# Patient Record
Sex: MALE | Race: WHITE | NOT HISPANIC OR LATINO | Employment: OTHER | URBAN - METROPOLITAN AREA
[De-identification: names, ages, dates, MRNs, and addresses within clinical notes are randomized per-mention and may not be internally consistent; named-entity substitution may affect disease eponyms.]

---

## 2024-05-06 PROBLEM — M79.7 FIBROMYALGIA SYNDROME: Status: ACTIVE | Noted: 2024-05-06

## 2024-05-08 ENCOUNTER — CLINICAL SUPPORT (OUTPATIENT)
Age: 54
End: 2024-05-08
Payer: MEDICARE

## 2024-05-08 VITALS
DIASTOLIC BLOOD PRESSURE: 70 MMHG | TEMPERATURE: 97.5 F | SYSTOLIC BLOOD PRESSURE: 130 MMHG | WEIGHT: 290.5 LBS | HEART RATE: 70 BPM

## 2024-05-08 DIAGNOSIS — M17.9 OSTEOARTHRITIS OF KNEE, UNSPECIFIED LATERALITY, UNSPECIFIED OSTEOARTHRITIS TYPE: Primary | ICD-10-CM

## 2024-05-08 PROCEDURE — 20610 DRAIN/INJ JOINT/BURSA W/O US: CPT | Performed by: NURSE PRACTITIONER

## 2024-05-08 RX ORDER — FOSINOPRIL SODIUM 40 MG/1
40 TABLET ORAL DAILY
COMMUNITY
Start: 2024-01-30 | End: 2025-01-29

## 2024-05-08 RX ORDER — FLURBIPROFEN SODIUM 0.3 MG/ML
SOLUTION/ DROPS OPHTHALMIC
COMMUNITY
Start: 2024-02-05

## 2024-05-08 RX ORDER — ACYCLOVIR 400 MG/1
TABLET ORAL
COMMUNITY
Start: 2024-04-26

## 2024-05-08 RX ORDER — DAPAGLIFLOZIN 10 MG/1
10 TABLET, FILM COATED ORAL DAILY
COMMUNITY
Start: 2024-01-30 | End: 2025-01-29

## 2024-05-08 RX ORDER — METHYLPREDNISOLONE ACETATE 80 MG/ML
80 INJECTION, SUSPENSION INTRA-ARTICULAR; INTRALESIONAL; INTRAMUSCULAR; SOFT TISSUE
Status: DISCONTINUED | OUTPATIENT
Start: 2024-05-08 | End: 2024-05-08 | Stop reason: HOSPADM

## 2024-05-08 RX ORDER — INSULIN HUMAN 100 [IU]/ML
INJECTION, SUSPENSION SUBCUTANEOUS
COMMUNITY
Start: 2024-01-30 | End: 2025-01-30

## 2024-05-08 RX ORDER — FLURBIPROFEN SODIUM 0.3 MG/ML
SOLUTION/ DROPS OPHTHALMIC
COMMUNITY
Start: 2024-03-13

## 2024-05-08 RX ADMIN — METHYLPREDNISOLONE ACETATE 80 MG: 80 INJECTION, SUSPENSION INTRA-ARTICULAR; INTRALESIONAL; INTRAMUSCULAR; SOFT TISSUE at 10:49

## 2024-05-15 ENCOUNTER — CLINICAL SUPPORT (OUTPATIENT)
Age: 54
End: 2024-05-15
Payer: MEDICARE

## 2024-05-15 VITALS
DIASTOLIC BLOOD PRESSURE: 80 MMHG | HEIGHT: 71 IN | WEIGHT: 288.7 LBS | HEART RATE: 77 BPM | BODY MASS INDEX: 40.42 KG/M2 | SYSTOLIC BLOOD PRESSURE: 110 MMHG | TEMPERATURE: 97.9 F

## 2024-05-15 DIAGNOSIS — M17.0 BILATERAL PRIMARY OSTEOARTHRITIS OF KNEE: Primary | ICD-10-CM

## 2024-05-15 RX ORDER — METHYLPREDNISOLONE ACETATE 80 MG/ML
80 INJECTION, SUSPENSION INTRA-ARTICULAR; INTRALESIONAL; INTRAMUSCULAR; SOFT TISSUE
Status: DISCONTINUED | OUTPATIENT
Start: 2024-05-15 | End: 2024-05-15 | Stop reason: HOSPADM

## 2024-05-15 RX ADMIN — METHYLPREDNISOLONE ACETATE 80 MG: 80 INJECTION, SUSPENSION INTRA-ARTICULAR; INTRALESIONAL; INTRAMUSCULAR; SOFT TISSUE at 08:41

## 2024-05-15 NOTE — ASSESSMENT & PLAN NOTE
Most recent right knee steroid injection was given 2/6/2024  We will repeat injection today 5/15/2024  Risks and benefits of the procedure were discussed with the patient and consent was obtained.  See procedure documentation.

## 2024-05-15 NOTE — PROGRESS NOTES
Office Visit       Date: 05/15/2024   Patient Name: Zoran Lechuga  MRN: [id number]  YOB: 1970    Referring Physician: Provider, No Known     Chief Complaint:   Chief Complaint   Patient presents with    Injections       History of Present Illness: Zoran Lechuga is a 54 y.o. male who is here today for right knee steroid injection.  He most recently had this knee injected on 2/6/2024.  He has failed conservative measures.      Subjective     Past Medical History:   Past Medical History:   Diagnosis Date    Arthralgia     Aspiration pneumonia     Condition not found     pain relief by medication- SP 8/22/2017 pain medication agreement    Coronary artery disease 01/10/2019    s/p MI 11/15 with one stent. Dr. Rickie Boateng    COVID-19 01/23/2021    Fibromyalgia     Gastritis     Hip joint pain     bilateral    Hypothyroidism     Low back pain     Myocardial infarct     BLADIMIR (obstructive sleep apnea)     Osteoarthritis of both knees     Restless leg syndrome        Past Surgical History:   Past Surgical History:   Procedure Laterality Date    CARPAL TUNNEL RELEASE Right 2019    CHOLECYSTECTOMY      08/03/2020-02/2020- Gallstones + cholecystitis    CORONARY ANGIOPLASTY WITH STENT PLACEMENT  12/18/2020    heart stent    WRIST SURGERY      08/03/2020-2019 R CTS REelease; repair of [place] deformity       Family History:   Family History   Problem Relation Age of Onset    Other Mother         malignant neoplasm of uterus    Melanoma Brother        Social History:   Social History     Socioeconomic History    Marital status:     Number of children: 1   Tobacco Use    Smoking status: Never     Passive exposure: Never    Smokeless tobacco: Never   Substance and Sexual Activity    Alcohol use: Never    Drug use: Never    Sexual activity: Defer       Medications:   Current Outpatient Medications:     amLODIPine (NORVASC) 10 MG tablet, Take 1 tablet by mouth Daily., Disp: , Rfl:      aspirin 81 MG EC tablet, Take 1 tablet by mouth Daily., Disp: , Rfl:     atorvastatin (LIPITOR) 80 MG tablet, Take 99 mg by oral route everyday, Disp: , Rfl:     B-D UF III MINI PEN NEEDLES 31G X 5 MM misc, , Disp: , Rfl:     baclofen (LIORESAL) 10 MG tablet, Take 1 tablet by mouth 3 (Three) Times a Day., Disp: , Rfl:     betamethasone dipropionate (DIPROSONE) 0.05 % cream, Apply by topical route every day a thin layer to the affected areas, Disp: , Rfl:     chlorthalidone (HYGROTON) 25 MG tablet, Take 1 tablet by mouth Daily., Disp: , Rfl:     clopidogrel (PLAVIX) 75 MG tablet, Take 1 tablet by mouth Daily., Disp: , Rfl:     Continuous Glucose Sensor (Dexcom G7 Sensor) misc, , Disp: , Rfl:     dapagliflozin Propanediol 10 MG tablet, Take 10 mg by mouth Daily., Disp: , Rfl:     Diclofenac Sodium (VOLTAREN) 1 % gel gel, Apply 2 g topically to the appropriate area as directed 2 (Two) Times a Day., Disp: , Rfl:     empagliflozin (Jardiance) 25 MG tablet tablet, Take 1 tablet by mouth Every Morning., Disp: , Rfl:     folic acid (FOLVITE) 1 MG tablet, Take 1 tablet by mouth Daily., Disp: , Rfl:     fosinopril (MONOPRIL) 40 MG tablet, Take 1 tablet by mouth Daily., Disp: , Rfl:     gabapentin (NEURONTIN) 300 MG capsule, Take 3 capsules by mouth 3 (Three) Times a Day., Disp: , Rfl:     HumuLIN N KwikPen 100 UNIT/ML injection, INJECT 20UNITS IN THE MORNING AND 26 UNITS IN THE EVENING, Disp: , Rfl:     Insulin Lispro, 0.5 Unit Dial, (HumaLOG Asim KwikPen) 100 UNIT/ML solution pen-injector, Inject 10 Units under the skin into the appropriate area as directed. As per insulin protocol, Disp: , Rfl:     isosorbide mononitrate (IMDUR) 60 MG 24 hr tablet, Take 1 tablet by mouth Every Morning., Disp: , Rfl:     metFORMIN (GLUCOPHAGE) 1000 MG tablet, Take 1 tablet by mouth. Every evening, Disp: , Rfl:     methotrexate 2.5 MG tablet, Take 6 tablets by mouth 1 (One) Time Per Week., Disp: , Rfl:     metoprolol succinate XL  "(TOPROL-XL) 100 MG 24 hr tablet, Take 1 tablet by mouth Daily., Disp: , Rfl:     multivitamin (MULTIVITAMIN PO), Take 1 tablet by mouth Daily., Disp: , Rfl:     nitroglycerin (NITROSTAT) 0.4 MG SL tablet, Place 1 tablet under the tongue Every 5 (Five) Minutes As Needed for Chest Pain. Take no more than 3 doses in 15 minutes., Disp: , Rfl:     omeprazole (priLOSEC) 40 MG capsule, Take 1 capsule by mouth. Everyday before a meal, Disp: , Rfl:     Probiotic Product (PROBIOTIC-10 PO), Take  by mouth., Disp: , Rfl:     Semaglutide,0.25 or 0.5MG/DOS, (Ozempic, 0.25 or 0.5 MG/DOSE,) 2 MG/1.5ML solution pen-injector, Inject 0.25 mg under the skin into the appropriate area as directed Every 28 (Twenty-Eight) Days. Then inject 0.5 mg subcutaneously once weekly using rotating injection sites, Disp: , Rfl:     traMADol (ULTRAM) 50 MG tablet, Take 1 tablet by mouth Every 6 (Six) Hours As Needed., Disp: , Rfl:     traZODone (DESYREL) 50 MG tablet, Take 1 tablet by mouth Every Night., Disp: , Rfl:     UltiCare Mini Pen Needles 31G X 6 MM misc, , Disp: , Rfl:     Allergies: No Known Allergies    I reviewed the patient's chief complaint, history of present illness, review of systems, past medical history, surgical history, family history, social history, medications and allergy list.     Objective    Vital Signs:   Vitals:    05/15/24 0826   BP: 110/80   Pulse: 77   Temp: 97.9 °F (36.6 °C)   Weight: 131 kg (288 lb 11.2 oz)   Height: 180.3 cm (71\")   PainSc:   6   PainLoc: Back     Body mass index is 40.27 kg/m².           Physical Exam:  Physical Exam  Constitutional:       Appearance: Normal appearance.   HENT:      Head: Normocephalic and atraumatic.   Eyes:      Conjunctiva/sclera: Conjunctivae normal.      Pupils: Pupils are equal, round, and reactive to light.   Cardiovascular:      Rate and Rhythm: Normal rate and regular rhythm.      Heart sounds: Normal heart sounds.   Pulmonary:      Effort: Pulmonary effort is normal.      " Breath sounds: Normal breath sounds.   Musculoskeletal:         General: Normal range of motion.      Cervical back: Normal range of motion.      Comments: Right knee without swelling, warmth, erythema.   Skin:     General: Skin is warm and dry.   Neurological:      General: No focal deficit present.      Mental Status: He is alert and oriented to person, place, and time.   Psychiatric:         Mood and Affect: Mood normal.         Behavior: Behavior normal.         Thought Content: Thought content normal.         Judgment: Judgment normal.          Results Review:   Imaging Results (Last 24 Hours)       ** No results found for the last 24 hours. **        Arthrocentesis    Date/Time: 5/15/2024 8:41 AM    Performed by: Rola Park APRN  Authorized by: Rola Park APRN  Indications: pain   Body area: knee  Joint: right knee  Local anesthesia used: yes    Anesthesia:  Local anesthesia used: yes  Local Anesthetic: topical anesthetic and lidocaine 1% without epinephrine  Anesthetic total: 0.5 mL    Sedation:  Patient sedated: no    Preparation: Patient was prepped and draped in the usual sterile fashion.  Needle size: 22 G  Ultrasound guidance: no  Approach: anterior  Meds administered: 80 mg methylPREDNISolone acetate 80 MG/ML  Patient tolerance: patient tolerated the procedure well with no immediate complications           Assessment / Plan    Assessment/Plan:   Diagnoses and all orders for this visit:    1. Bilateral primary osteoarthritis of knee (Primary)  Assessment & Plan:  Most recent right knee steroid injection was given 2/6/2024  We will repeat injection today 5/15/2024  Risks and benefits of the procedure were discussed with the patient and consent was obtained.  See procedure documentation.    Orders:  -     Arthrocentesis        Follow Up:   Return in 3 months (on 8/15/2024) for Dr. Castellon.  OK to schedule knee injections in 3 months- can do both knees same day on or after 8/15/24.      Rola Park  DANNIE  [facility]

## 2024-07-10 DIAGNOSIS — M25.541 ARTHRALGIA OF BOTH HANDS: Primary | ICD-10-CM

## 2024-07-10 DIAGNOSIS — M25.542 ARTHRALGIA OF BOTH HANDS: Primary | ICD-10-CM

## 2024-07-10 NOTE — TELEPHONE ENCOUNTER
Pt sent message via [employer] requesting refills for MTX and Folic Acid.  His MTX was increased to 8/week at his 4/22/24 visit.  He has appt on 8/15/24.  He had labs today and labs are in chart for [facility] to review.  Can you please send rx as the system won't allow me to send? Thank you. -CHUCK Causey

## 2024-07-13 RX ORDER — FOLIC ACID 1 MG/1
TABLET ORAL
Qty: 30 TABLET | Refills: 1 | Status: SHIPPED | OUTPATIENT
Start: 2024-07-13

## 2024-07-13 RX ORDER — METHOTREXATE 2.5 MG/1
TABLET ORAL
Qty: 32 TABLET | Refills: 1 | Status: SHIPPED | OUTPATIENT
Start: 2024-07-13

## 2024-08-14 PROBLEM — Z79.899 ENCOUNTER FOR LONG-TERM (CURRENT) USE OF HIGH-RISK MEDICATION: Status: ACTIVE | Noted: 2024-08-14

## 2024-08-14 PROBLEM — M25.512 LEFT SHOULDER PAIN: Status: ACTIVE | Noted: 2024-08-14

## 2024-08-14 PROBLEM — M54.9 BACK PAIN: Status: ACTIVE | Noted: 2024-08-14

## 2024-08-14 PROBLEM — M25.50 ARTHRALGIA: Status: ACTIVE | Noted: 2024-08-14

## 2024-08-14 NOTE — ASSESSMENT & PLAN NOTE
XR L shoulder normal 6/23  S/p injection June 2023    Does well with injection. Can call to schedule.  If sx worsen next option is MRI.

## 2024-08-14 NOTE — ASSESSMENT & PLAN NOTE
Intermittent muscle pain the last 3 weeks.   Currently on Baclofen and Gabapentin.     Continue Baclofen.   Continue gabapentin 400mg TID  Would like him to start YouTube - Chair aerobics or chair yoga. Needs to start.  Biofreeze, CBD oil or lotion. Using Biofreeze.  Voltaren is ok to use on the wrist.   Pcp is also prescribing low dose tramadol.

## 2024-08-14 NOTE — ASSESSMENT & PLAN NOTE
Combo of Mechanical + djd ; h/o epidural in 2003.   S/p PT in the past.   MRI lumbar spine 12/23 - multilevel DDD with facet arthritis. Moderate central and foraminal stenosis at L4-5. Mild central and L foraminal stenosis at L5-S1.   PT and exercise worsens back pain.   The more he walk, the worse his pain is. Radicular sx  bilaterally with walking.  Back stiffness every morning for 3-4 hours. Positive radicular sx bilaterally.    Will refer to NS. May need myelogram vs pain treatment. His sx I cannot see correlating with MRI report.

## 2024-08-14 NOTE — ASSESSMENT & PLAN NOTE
Multiple joints mildly tender; + Stiffness in the morning for 2.5 hours.  Distribution concerning for inflammatory arthritis in the hands.  Crepitus in the hands with flexion.  Positive Trigger thumbs.  KEVIN,CCP,RF3 negative August 2017.  Repeat RF3, CCP, CRP, ESR were negative, KEVIN was low positive 1:80 ( not significant) in 2021.   07/21 CMP: Calcium 8.2 CBC Normal.   Started Plaquenil trial in 12/18. Stopped 2/2020 due to suspected occular toxicity.   He states his cardiologist doesn't want him to take  Nsaids/tramadol or steroids.  Esr and CRP normal 5/22: 6/23.  Foot films 12/23 negative for erosions  Hand films 12/23 no erosions.    Will keep early PSA in mind.  Shoulders and knees do well with injections.  He did a mtx trial for PsA. Initially had significant improvement for one month. He now has stiffness and swelling again in the R hand. Increase mtx to 8 tablets weekly.   Contjnue folic acid 1mg daily except mtx day.

## 2024-08-14 NOTE — PROGRESS NOTES
Office Visit       Date: 08/15/2024   Patient Name: Zoran Lechuga  MRN: [id number]  YOB: 1970    Referring Physician: Abhinav Paredes*     Chief Complaint:   Chief Complaint   Patient presents with    Injections     Knees bilateral       History of Present Illness: Zoran Lechuga is a 54 y.o. male who is here today for bilateral knee steroid injections.  His right knee was most recently injected 5/15/2024.  His left knee was most recently injected 5/8/2024.  He has failed conservative measures.    Subjective       Past Medical History:   Past Medical History:   Diagnosis Date    Arthralgia     Aspiration pneumonia     Condition not found     pain relief by medication- SP 8/22/2017 pain medication agreement    Coronary artery disease 01/10/2019    s/p MI 11/15 with one stent. Dr. Rickie Boateng    COVID-19 01/23/2021    Fibromyalgia     Gastritis     Hip joint pain     bilateral    Hypothyroidism     Low back pain     Myocardial infarct     BLADIMIR (obstructive sleep apnea)     Osteoarthritis of both knees     Restless leg syndrome        Past Surgical History:   Past Surgical History:   Procedure Laterality Date    CARPAL TUNNEL RELEASE Right 2019    CHOLECYSTECTOMY      08/03/2020-02/2020- Gallstones + cholecystitis    CORONARY ANGIOPLASTY WITH STENT PLACEMENT  12/18/2020    heart stent    WRIST SURGERY      08/03/2020-2019 R CTS REelease; repair of [place] deformity       Family History:   Family History   Problem Relation Age of Onset    Other Mother         malignant neoplasm of uterus    Melanoma Brother        Social History:   Social History     Socioeconomic History    Marital status:     Number of children: 1   Tobacco Use    Smoking status: Never     Passive exposure: Never    Smokeless tobacco: Never   Vaping Use    Vaping status: Never Used   Substance and Sexual Activity    Alcohol use: Never    Drug use: Never    Sexual activity: Defer        Medications:   Current Outpatient Medications:     amLODIPine (NORVASC) 10 MG tablet, Take 1 tablet by mouth Daily., Disp: , Rfl:     aspirin 81 MG EC tablet, Take 1 tablet by mouth Daily., Disp: , Rfl:     atorvastatin (LIPITOR) 80 MG tablet, Take 99 mg by oral route everyday, Disp: , Rfl:     B-D UF III MINI PEN NEEDLES 31G X 5 MM misc, , Disp: , Rfl:     baclofen (LIORESAL) 10 MG tablet, Take 1 tablet by mouth 3 (Three) Times a Day., Disp: , Rfl:     betamethasone dipropionate (DIPROSONE) 0.05 % cream, Apply by topical route every day a thin layer to the affected areas, Disp: , Rfl:     chlorthalidone (HYGROTON) 25 MG tablet, Take 1 tablet by mouth Daily., Disp: , Rfl:     clopidogrel (PLAVIX) 75 MG tablet, Take 1 tablet by mouth Daily., Disp: , Rfl:     Continuous Glucose Sensor (Dexcom G7 Sensor) misc, , Disp: , Rfl:     dapagliflozin Propanediol 10 MG tablet, Take 10 mg by mouth Daily., Disp: , Rfl:     Diclofenac Sodium (VOLTAREN) 1 % gel gel, Apply 2 g topically to the appropriate area as directed 2 (Two) Times a Day., Disp: , Rfl:     empagliflozin (Jardiance) 25 MG tablet tablet, Take 1 tablet by mouth Every Morning., Disp: , Rfl:     folic acid (FOLVITE) 1 MG tablet, Take 1 tablet daily except the day of Methotrexate., Disp: 30 tablet, Rfl: 1    fosinopril (MONOPRIL) 40 MG tablet, Take 1 tablet by mouth Daily., Disp: , Rfl:     gabapentin (NEURONTIN) 300 MG capsule, Take 3 capsules by mouth 3 (Three) Times a Day., Disp: , Rfl:     HumuLIN N KwikPen 100 UNIT/ML injection, INJECT 20UNITS IN THE MORNING AND 26 UNITS IN THE EVENING, Disp: , Rfl:     Insulin Lispro, 0.5 Unit Dial, (HumaLOG Asim KwikPen) 100 UNIT/ML solution pen-injector, Inject 10 Units under the skin into the appropriate area as directed. As per insulin protocol, Disp: , Rfl:     isosorbide mononitrate (IMDUR) 60 MG 24 hr tablet, Take 1 tablet by mouth Every Morning., Disp: , Rfl:     metFORMIN (GLUCOPHAGE) 1000 MG tablet, Take 1  "tablet by mouth. Every evening, Disp: , Rfl:     methotrexate 2.5 MG tablet, Take 8 tabs po once weekly., Disp: 32 tablet, Rfl: 1    metoprolol succinate XL (TOPROL-XL) 100 MG 24 hr tablet, Take 1 tablet by mouth Daily., Disp: , Rfl:     multivitamin (MULTIVITAMIN PO), Take 1 tablet by mouth Daily., Disp: , Rfl:     nitroglycerin (NITROSTAT) 0.4 MG SL tablet, Place 1 tablet under the tongue Every 5 (Five) Minutes As Needed for Chest Pain. Take no more than 3 doses in 15 minutes., Disp: , Rfl:     omeprazole (priLOSEC) 40 MG capsule, Take 1 capsule by mouth. Everyday before a meal, Disp: , Rfl:     Probiotic Product (PROBIOTIC-10 PO), Take  by mouth., Disp: , Rfl:     Semaglutide,0.25 or 0.5MG/DOS, (Ozempic, 0.25 or 0.5 MG/DOSE,) 2 MG/1.5ML solution pen-injector, Inject 0.25 mg under the skin into the appropriate area as directed Every 28 (Twenty-Eight) Days. Then inject 0.5 mg subcutaneously once weekly using rotating injection sites, Disp: , Rfl:     traMADol (ULTRAM) 50 MG tablet, Take 1 tablet by mouth Every 6 (Six) Hours As Needed., Disp: , Rfl:     traZODone (DESYREL) 50 MG tablet, Take 1 tablet by mouth Every Night., Disp: , Rfl:     UltiCare Mini Pen Needles 31G X 6 MM misc, , Disp: , Rfl:   No current facility-administered medications for this visit.    Allergies: No Known Allergies    I reviewed the patient's chief complaint, history of present illness, review of systems, past medical history, surgical history, family history, social history, medications and allergy list.     Objective    Vital Signs:   Vitals:    08/15/24 0909   BP: 132/78   BP Location: Right arm   Pulse: 77   Temp: 98 °F (36.7 °C)   Weight: 132 kg (291 lb 11.2 oz)   Height: 180.3 cm (71\")   PainSc:   6   PainLoc: Generalized     Body mass index is 40.68 kg/m².           Physical Exam:  Physical Exam  Constitutional:       Appearance: Normal appearance.   HENT:      Head: Normocephalic and atraumatic.   Eyes:      Conjunctiva/sclera: " Conjunctivae normal.      Pupils: Pupils are equal, round, and reactive to light.   Cardiovascular:      Rate and Rhythm: Normal rate.   Pulmonary:      Effort: Pulmonary effort is normal.   Musculoskeletal:         General: Normal range of motion.      Cervical back: Normal range of motion.      Comments: Bilateral knees without warmth, erythema, effusion.   Skin:     General: Skin is warm and dry.   Neurological:      General: No focal deficit present.      Mental Status: He is alert and oriented to person, place, and time.   Psychiatric:         Mood and Affect: Mood normal.         Behavior: Behavior normal.         Thought Content: Thought content normal.         Judgment: Judgment normal.          Results Review:   Imaging Results (Last 24 Hours)       ** No results found for the last 24 hours. **            Assessment / Plan    Assessment/Plan:   Diagnoses and all orders for this visit:    1. Bilateral primary osteoarthritis of knee (Primary)  Assessment & Plan:  Chronic  stiffness and pain ; h/o steroid and Synvisc injxn--w/o relief; s/p left knee repair 2010/11.  S/p bilateral knee injections June 2023  Left knee steroid 5/8/24  Right knee steroid 5/15/24    He is here today for repeat bilateral knee steroid injections. See procedure documentation.  Continue Weight loss, water exercise if able.  If knee symptoms worsen, will have him see ortho.     Orders:  -     Arthrocentesis  -     methylPREDNISolone acetate (DEPO-medrol) injection 80 mg      Arthrocentesis    Date/Time: 8/15/2024 9:22 AM    Performed by: Rola Park APRN  Authorized by: Rola Park APRN  Indications: pain   Location: Bilateral knee.  Local anesthesia used: yes    Anesthesia:  Local anesthesia used: yes  Local Anesthetic: lidocaine 1% without epinephrine and topical anesthetic (Ethyl chloride)  Anesthetic total: 0.5 mL    Sedation:  Patient sedated: no    Preparation: Aseptic technique with iodine site preparation.  Needle size: 22  G  Ultrasound guidance: no  Meds administered: 80 mg methylPREDNISolone acetate 80 MG/ML  Patient tolerance: patient tolerated the procedure well with no immediate complications  Comments: Each knee was administered 0.5 cc lidocaine 1% and 80 mg methylprednisolone acetate          Follow Up:   Return OK to schedule bilateral knee steroid on or after 11/15/24, for Next scheduled follow up.        DANNIE Yusuf   Rheumatology [facility]

## 2024-08-14 NOTE — ASSESSMENT & PLAN NOTE
Chronic  stiffness and pain ; h/o steroid and Synvisc injxn--w/o relief; s/p left knee repair 2010/11.  S/p bilateral knee injections June 2023  Left knee steroid 5/8/24  Right knee steroid 5/15/24    He is here today for repeat bilateral knee steroid injections. See procedure documentation.  Continue Weight loss, water exercise if able.  If knee symptoms worsen, will have him see ortho.

## 2024-08-14 NOTE — ASSESSMENT & PLAN NOTE
Methotrexate - well tolerated and was effective for the first month. Increasing dose today.  Hep panel negative 12/23    Cbc, Cmp 7/24 glucose 178 otherwise stable    Would hold methotrexate for any infection or illness, Seek treatment and when well and illness is resolved you may restart medication.

## 2024-08-15 ENCOUNTER — CLINICAL SUPPORT (OUTPATIENT)
Age: 54
End: 2024-08-15
Payer: MEDICARE

## 2024-08-15 VITALS
DIASTOLIC BLOOD PRESSURE: 78 MMHG | HEART RATE: 77 BPM | TEMPERATURE: 98 F | BODY MASS INDEX: 40.84 KG/M2 | WEIGHT: 291.7 LBS | SYSTOLIC BLOOD PRESSURE: 132 MMHG | HEIGHT: 71 IN

## 2024-08-15 DIAGNOSIS — M17.0 BILATERAL PRIMARY OSTEOARTHRITIS OF KNEE: Primary | ICD-10-CM

## 2024-08-15 PROBLEM — E11.65 TYPE 2 DIABETES MELLITUS WITH HYPERGLYCEMIA, WITH LONG-TERM CURRENT USE OF INSULIN: Chronic | Status: ACTIVE | Noted: 2024-08-15

## 2024-08-15 PROBLEM — K76.0 FATTY LIVER: Status: ACTIVE | Noted: 2024-01-12

## 2024-08-15 PROBLEM — I25.10 CAD (CORONARY ARTERY DISEASE): Status: ACTIVE | Noted: 2017-01-16

## 2024-08-15 PROBLEM — Z79.4 TYPE 2 DIABETES MELLITUS WITH HYPERGLYCEMIA, WITH LONG-TERM CURRENT USE OF INSULIN: Chronic | Status: ACTIVE | Noted: 2024-08-15

## 2024-08-15 RX ORDER — METHYLPREDNISOLONE ACETATE 80 MG/ML
80 INJECTION, SUSPENSION INTRA-ARTICULAR; INTRALESIONAL; INTRAMUSCULAR; SOFT TISSUE
Status: COMPLETED | OUTPATIENT
Start: 2024-08-15 | End: 2024-08-15

## 2024-08-15 RX ADMIN — METHYLPREDNISOLONE ACETATE 80 MG: 80 INJECTION, SUSPENSION INTRA-ARTICULAR; INTRALESIONAL; INTRAMUSCULAR; SOFT TISSUE at 09:22

## 2024-09-05 DIAGNOSIS — M25.542 ARTHRALGIA OF BOTH HANDS: ICD-10-CM

## 2024-09-05 DIAGNOSIS — M25.541 ARTHRALGIA OF BOTH HANDS: ICD-10-CM

## 2024-09-05 RX ORDER — FOLIC ACID 1 MG/1
TABLET ORAL
Qty: 30 TABLET | Refills: 0 | Status: SHIPPED | OUTPATIENT
Start: 2024-09-05

## 2024-09-05 RX ORDER — METHOTREXATE 2.5 MG/1
TABLET ORAL
Qty: 32 TABLET | Refills: 0 | Status: SHIPPED | OUTPATIENT
Start: 2024-09-05

## 2024-09-05 NOTE — TELEPHONE ENCOUNTER
Pt sent message via [employer] requesting refills for his MTX and Folic Acid.  He doesn't have enough to last until his appt on 9/17/24.  He has current labs in his chart.  I will send in refills for both for a one month supply no refills. -Thuy Degroot, A

## 2024-09-13 ENCOUNTER — OFFICE VISIT (OUTPATIENT)
Age: 54
End: 2024-09-13
Payer: MEDICARE

## 2024-09-13 ENCOUNTER — TELEPHONE (OUTPATIENT)
Age: 54
End: 2024-09-13

## 2024-09-13 VITALS
HEART RATE: 70 BPM | TEMPERATURE: 97.2 F | BODY MASS INDEX: 39.58 KG/M2 | DIASTOLIC BLOOD PRESSURE: 80 MMHG | HEIGHT: 71 IN | SYSTOLIC BLOOD PRESSURE: 130 MMHG | WEIGHT: 282.7 LBS

## 2024-09-13 DIAGNOSIS — M51.369 DDD (DEGENERATIVE DISC DISEASE), LUMBAR: ICD-10-CM

## 2024-09-13 DIAGNOSIS — L40.50 PSA (PSORIATIC ARTHRITIS): ICD-10-CM

## 2024-09-13 DIAGNOSIS — M25.542 ARTHRALGIA OF BOTH HANDS: ICD-10-CM

## 2024-09-13 DIAGNOSIS — Z79.899 HIGH RISK MEDICATION USE: ICD-10-CM

## 2024-09-13 DIAGNOSIS — R53.83 OTHER FATIGUE: Primary | ICD-10-CM

## 2024-09-13 DIAGNOSIS — M25.541 ARTHRALGIA OF BOTH HANDS: ICD-10-CM

## 2024-09-13 RX ORDER — FOLIC ACID 1 MG/1
TABLET ORAL
Qty: 90 TABLET | Refills: 1 | Status: SHIPPED | OUTPATIENT
Start: 2024-09-13

## 2024-09-13 RX ORDER — SEMAGLUTIDE 2.68 MG/ML
2 INJECTION, SOLUTION SUBCUTANEOUS
COMMUNITY
Start: 2024-07-26

## 2024-09-13 RX ORDER — APREMILAST 30 MG/1
30 TABLET, FILM COATED ORAL 2 TIMES DAILY
Qty: 60 TABLET | Refills: 2 | Status: SHIPPED | OUTPATIENT
Start: 2024-09-13

## 2024-09-13 RX ORDER — METHOTREXATE 2.5 MG/1
15 TABLET ORAL WEEKLY
Qty: 24 TABLET | Refills: 4 | Status: SHIPPED | OUTPATIENT
Start: 2024-09-13

## 2024-09-13 RX ORDER — APREMILAST 30 MG/1
1 TABLET, FILM COATED ORAL 2 TIMES DAILY
Qty: 60 TABLET | Refills: 3 | Status: SHIPPED | OUTPATIENT
Start: 2024-09-13 | End: 2024-09-13 | Stop reason: SDUPTHER

## 2024-09-13 RX ORDER — APREMILAST 10-20-30MG
1 KIT ORAL TAKE AS DIRECTED
Qty: 1 EACH | Refills: 0 | Status: SHIPPED | OUTPATIENT
Start: 2024-09-13 | End: 2024-09-13 | Stop reason: SDUPTHER

## 2024-09-13 RX ORDER — ISOSORBIDE MONONITRATE 120 MG/1
120 TABLET, EXTENDED RELEASE ORAL DAILY
COMMUNITY
Start: 2024-07-26 | End: 2025-07-26

## 2024-09-13 RX ORDER — TRAZODONE HYDROCHLORIDE 100 MG/1
100 TABLET ORAL NIGHTLY
COMMUNITY
Start: 2024-08-12 | End: 2024-11-20 | Stop reason: SDUPTHER

## 2024-09-13 RX ORDER — APREMILAST 10-20-30MG
1 KIT ORAL TAKE AS DIRECTED
Qty: 1 EACH | Refills: 0 | Status: SHIPPED | OUTPATIENT
Start: 2024-09-13

## 2024-09-13 RX ORDER — METFORMIN HYDROCHLORIDE 750 MG/1
750 TABLET, EXTENDED RELEASE ORAL DAILY
COMMUNITY
Start: 2024-07-26 | End: 2025-07-26

## 2024-09-13 NOTE — PROGRESS NOTES
[facility] Follow Up Visit     Office Follow Up      Date: 09/13/2024   Patient Name: Zoran Lechuga  MRN: [id number]  YOB: 1970    Referring Physician: Eliza Castellon MD     Chief Complaint   Patient presents with    Fibromyalgia    Osteoarthritis       History of Present Illness: Zoran Lechuga is a 54 y.o. male who is here today for follow up on   History of Present Illness  The patient presents for evaluation of multiple medical concerns.    He experiences morning stiffness lasting approximately 2.5 hours, which is typical for him. He reports pain in his hands, left shoulder, and back, rating it as 6 out of 10. He has noticed an increase in the size of the knots on his fingers, which are stiff and painful. He also reports pain and swelling in the metacarpophalangeal (MCP) and proximal interphalangeal (PIP) joints. His skin condition is currently stable. All rheumatoid tests have returned negative results. He is on a high dose of methotrexate, taking 8 pills once a week. He does not believe that methotrexate is providing any relief. He requires refills for his methotrexate and folic acid prescriptions.    He reports tenderness in his muscles. His left shoulder pain extends into his back and up to his neck. He received an injection for this issue, which provided temporary relief. He has been performing physical therapy exercises, which he finds beneficial. He describes the pain as an ache.    His back pain is progressively worsening. He continues to attend the pain clinic. He was previously referred to neurosurgery, but has not received any communication from them. He reports numbness and tingling in his left leg. His last MRI was conducted in December 2023.    He is making efforts to lose weight by monitoring his diet and calorie intake.    He reports no significant depression.       Result Review :        Results  Laboratory Studies  Glucose  is 152. CBC and CMP are normal.          Subjective     No Known Allergies      Current Outpatient Medications:     amLODIPine (NORVASC) 10 MG tablet, Take 1 tablet by mouth Daily., Disp: , Rfl:     aspirin 81 MG EC tablet, Take 1 tablet by mouth Daily., Disp: , Rfl:     atorvastatin (LIPITOR) 80 MG tablet, Take 99 mg by oral route everyday, Disp: , Rfl:     B-D UF III MINI PEN NEEDLES 31G X 5 MM misc, , Disp: , Rfl:     baclofen (LIORESAL) 10 MG tablet, Take 1 tablet by mouth 3 (Three) Times a Day., Disp: , Rfl:     betamethasone dipropionate (DIPROSONE) 0.05 % cream, Apply by topical route every day a thin layer to the affected areas, Disp: , Rfl:     chlorthalidone (HYGROTON) 25 MG tablet, Take 1 tablet by mouth Daily., Disp: , Rfl:     clopidogrel (PLAVIX) 75 MG tablet, Take 1 tablet by mouth Daily., Disp: , Rfl:     Continuous Glucose Sensor (Dexcom G7 Sensor) misc, , Disp: , Rfl:     dapagliflozin Propanediol 10 MG tablet, Take 10 mg by mouth Daily., Disp: , Rfl:     Diclofenac Sodium (VOLTAREN) 1 % gel gel, Apply 2 g topically to the appropriate area as directed 2 (Two) Times a Day., Disp: , Rfl:     empagliflozin (Jardiance) 25 MG tablet tablet, Take 1 tablet by mouth Every Morning., Disp: , Rfl:     folic acid (FOLVITE) 1 MG tablet, Take 1 tablet daily except the day of Methotrexate., Disp: 30 tablet, Rfl: 0    fosinopril (MONOPRIL) 40 MG tablet, Take 1 tablet by mouth Daily., Disp: , Rfl:     gabapentin (NEURONTIN) 300 MG capsule, Take 3 capsules by mouth 3 (Three) Times a Day., Disp: , Rfl:     HumuLIN N KwikPen 100 UNIT/ML injection, INJECT 20UNITS IN THE MORNING AND 26 UNITS IN THE EVENING, Disp: , Rfl:     Insulin Lispro, 0.5 Unit Dial, (HumaLOG Asim KwikPen) 100 UNIT/ML solution pen-injector, Inject 10 Units under the skin into the appropriate area as directed. As per insulin protocol, Disp: , Rfl:     isosorbide mononitrate (IMDUR) 120 MG 24 hr tablet, Take 1 tablet by mouth Daily., Disp: , Rfl:      metFORMIN ER (GLUCOPHAGE-XR) 750 MG 24 hr tablet, Take 1 tablet by mouth Daily., Disp: , Rfl:     methotrexate 2.5 MG tablet, Take 8 tabs po once weekly., Disp: 32 tablet, Rfl: 0    metoprolol succinate XL (TOPROL-XL) 100 MG 24 hr tablet, Take 1 tablet by mouth Daily., Disp: , Rfl:     multivitamin (MULTIVITAMIN PO), Take 1 tablet by mouth Daily., Disp: , Rfl:     nitroglycerin (NITROSTAT) 0.4 MG SL tablet, Place 1 tablet under the tongue Every 5 (Five) Minutes As Needed for Chest Pain. Take no more than 3 doses in 15 minutes., Disp: , Rfl:     omeprazole (priLOSEC) 40 MG capsule, Take 1 capsule by mouth. Everyday before a meal, Disp: , Rfl:     Ozempic, 2 MG/DOSE, 8 MG/3ML solution pen-injector, Inject 2 mg under the skin into the appropriate area as directed Every 7 (Seven) Days., Disp: , Rfl:     Probiotic Product (PROBIOTIC-10 PO), Take  by mouth., Disp: , Rfl:     traMADol (ULTRAM) 50 MG tablet, Take 1 tablet by mouth Every 6 (Six) Hours As Needed., Disp: , Rfl:     traZODone (DESYREL) 100 MG tablet, Take 1 tablet by mouth Every Night., Disp: , Rfl:     UltiCare Mini Pen Needles 31G X 6 MM misc, , Disp: , Rfl:     Past Medical History:   Diagnosis Date    Arthralgia     Aspiration pneumonia     Condition not found     pain relief by medication- SP 8/22/2017 pain medication agreement    Coronary artery disease 01/10/2019    s/p MI 11/15 with one stent. Dr. Rickie Boateng    COVID-19 01/23/2021    Fibromyalgia     Gastritis     Hip joint pain     bilateral    Hypothyroidism     Low back pain     Myocardial infarct     BLADIMIR (obstructive sleep apnea)     Osteoarthritis of both knees     Restless leg syndrome         Past Surgical History:   Procedure Laterality Date    CARPAL TUNNEL RELEASE Right 2019    CHOLECYSTECTOMY      08/03/2020-02/2020- Gallstones + cholecystitis    CORONARY ANGIOPLASTY WITH STENT PLACEMENT  12/18/2020    heart stent    WRIST SURGERY      08/03/2020-2019 R CTS REelease; repair of [place]  "deformity       Family History   Problem Relation Age of Onset    Other Mother         malignant neoplasm of uterus    Melanoma Brother         Social History     Socioeconomic History    Marital status:     Number of children: 1   Tobacco Use    Smoking status: Never     Passive exposure: Never    Smokeless tobacco: Never   Vaping Use    Vaping status: Never Used   Substance and Sexual Activity    Alcohol use: Never    Drug use: Never    Sexual activity: Defer       Review of Systems   Constitutional:  Positive for fatigue.   Endocrine: Positive for heat intolerance.   Musculoskeletal:  Positive for arthralgias, back pain, joint swelling, myalgias, neck pain and neck stiffness.   Neurological:  Positive for headache.   Psychiatric/Behavioral:  Positive for sleep disturbance.       I have reviewed and updated the patient's chief complaint, history of present illness, review of systems, past medical history, surgical history, family history, social history, medications and allergy list as appropriate.     Objective    Vital Signs:   Vitals:    09/13/24 0844   BP: 130/80   BP Location: Left arm   Patient Position: Sitting   Cuff Size: Large Adult   Pulse: 70   Temp: 97.2 °F (36.2 °C)   Weight: 128 kg (282 lb 11.2 oz)   Height: 180.3 cm (70.98\")   PainSc:   6     Zoran Lechuga reports a pain score of 6.  Given his pain assessment as noted, treatment options were discussed and the following options were decided upon as a follow-up plan to address the patient's pain: .      Body mass index is 39.45 kg/m².         Physical Exam   Physical Exam  Patient is alert, no acute distress.  Head is atraumatic, normocephalic. Pupils are equal and round with extraocular muscles intact. Oropharynx is negative, but tongue has a slight coating and is dry.  Lungs are clear.  Heart is regular without rubs, gallops, or murmurs.  Thoracic and lumbar spine are tender. Trapezius muscles are tender bilaterally, worse on the left. " Good range of motion to the right of the cervical spine but moderate and tender to the left. Left greater than right shoulder tenderness. On the right hand, the wrist, the MCPs and the third PIP are tender. There is swelling of the second and third MCP. On the left hand, the wrist is tender. The MCPs and PIPs are tender. There is some swelling of the second and third MCP on the left. Right knee is tender medially. Ankles and MTPs are nontender.  Skin is clear of psoriasis today.    Physical Exam  There is currently no information documented on the homunculus. Go to the Rheumat[facility] activity and [facility].     Results Review:   Imaging Results (Last 24 Hours)       ** No results found for the last 24 hours. **            Procedures    Assessment / Plan    Assessment/Plan:   There are no diagnoses linked to this encounter.      Assessment & Plan  1. Psoriatic Arthritis.  He has multiple joints tender with swelling of the MCPs and morning stiffness lasting 2.5 hours. Rheumatoid factor III, CCP, and KEVIN have been negative on several occasions. He did a Plaquenil trial in December 2018 and stopped in 2020 due to suspected ocular toxicity. His cardiologist does not want him to take anti-inflammatories, tramadol, or steroids. Hand and foot films were negative for erosions in 2023. Methotrexate is not effective for him. He will be switched to Otezla, which will be preauthorized. Methotrexate will be tapered to 6 tablets once weekly, then to 4 tablets once weekly over the next few months.    2. Fibromyalgia.  He has intermittent muscle pain and is currently taking baclofen and gabapentin. His traps are tender. He will continue baclofen and gabapentin. He is advised to do chair yoga on YouTube and water walking if he has access to a pool. He takes low-dose tramadol for flareups.    3. Left Shoulder Pain.  An x-ray of the left shoulder in June 2023 was normal. He had an injection with some relief. The  pain may be related to his underlying inflammatory arthritis and may improve with the change in medication. If it worsens, an MRI may be needed.    4. Back Pain.  He has mechanical issues and degenerative arthritis. An epidural was done in 2003. Physical therapy and exercise worsen his back pain. An MRI in December 2023 showed multilevel DDD with facet arthritis, moderate central and foraminal stenosis at L4-L5, and mild central and left foraminal stenosis at L5-S1. He has increasing pain with walking and radicular symptoms bilaterally, with tingling and numbness in the left leg. He will be referred to neurosurgery for evaluation and pain treatment.    5. Degenerative Arthritis of the Knee.  He has chronic stiffness and pain status post steroids and Synvisc injections without relief. A left knee repair was done around 2010 or 2011. He has been having injections over the past year. Ortho wanted to do a knee replacement when he was 47, but he has been postponing it. He is unable to get into water exercise, which would help, and continue weight loss. Methotrexate will be tapered off, and Otezla will be tried. Labs for methotrexate (CBC, CMP) will continue every 2 months until he is off it. A QTB or TB blood test is needed. Methotrexate or Otezla should be held for infection or illness and resumed after treatment. If he takes a COVID-19 vaccination, methotrexate should be held for 1 to 2 weeks after.          Follow Up:   No follow-ups on file.       Eliza Castellon MD  [facility]     Encounter Administratively Closed by [employer]

## 2024-09-13 NOTE — TELEPHONE ENCOUNTER
PA request has been approved and pharmacy notified (Filling pharmacy will be notified by phone, fax, or submitted prescription)    Authorized Medication: Otezla start pack  Name of Insurance Approving PA: Nona ROE Effective Dates: 9/13/24-12/31/24  Dispensing Pharmacy: [facility]    PA request has been approved and pharmacy notified (Filling pharmacy will be notified by phone, fax, or submitted prescription)    Authorized Medication: Otezla 30mg twice daily  Name of Insurance Approving PA: Nona ROE Effective Dates: 9/13/24-12/31/24      Will need to send to a pharmacy in [place] for patient to fill Otezla.     Latasha Li, PharmD, BCPS  Clinical Specialty Pharmacist, Rheumatology   9/13/2024  12:21 EDT

## 2024-09-13 NOTE — TELEPHONE ENCOUNTER
Otezla starter pack and Otezla maintenance dose sent to [facility] pharmacy. [facility] pharmacy will contact the patient to set up delivery of prescription.    Latasha Li, PharmD, BCPS  Clinical Specialty Pharmacist, Rheumatology   9/13/2024  15:20 EDT

## 2024-09-13 NOTE — TELEPHONE ENCOUNTER
Patient with psoriatic arthritis does not have a good response to high-dose methotrexate.  Please preauthorize Otezla.

## 2024-09-13 NOTE — TELEPHONE ENCOUNTER
Attempted to send Otezla prescriptions to [facility]. Unable to e-prescribe at this time. Will try to send again first thing on Monday morning.    Latasha Li, PharmD, BCPS  Clinical Specialty Pharmacist, Rheumatology   9/13/2024  15:02 EDT

## 2024-09-13 NOTE — TELEPHONE ENCOUNTER
PA request has been approved and pharmacy notified (Filling pharmacy will be notified by phone, fax, or submitted prescription)    Authorized Medication: Otezla start pack  Name of Insurance Approving PA: Nona ROE Effective Dates: 9/13/24-12/31/24  Dispensing Pharmacy: [facility]      Prior authorization initiated by [facility].  Update will be provided when a determination has been received.     Medication: Otezla 30mg twice daily  PA Submission Method: CMM  Case Number/CMM Key: [id number]      Latasha Li, PharmD, BCPS  Clinical Specialty Pharmacist, Rheumatology   9/13/2024  10:52 EDT

## 2024-09-16 ENCOUNTER — SPECIALTY PHARMACY (OUTPATIENT)
Age: 54
End: 2024-09-16
Payer: MEDICARE

## 2024-09-19 ENCOUNTER — TELEPHONE (OUTPATIENT)
Age: 54
End: 2024-09-19
Payer: MEDICARE

## 2024-09-19 NOTE — TELEPHONE ENCOUNTER
Pt sent message via [employer] asking if [facility] could prescribe him something else in place of the Otezla. He said that his pharmacy has told him that there supplier doesn't carry this medication for some reason.  I asked him if he had checked with other pharmacies, but he said [facility] was the only pharmacy in [place] carrying it, but they were sold out and not sure if/when they'd be getting more in stock. -Thuy Degroot, A

## 2024-09-27 ENCOUNTER — OFFICE VISIT (OUTPATIENT)
Dept: NEUROSURGERY | Facility: CLINIC | Age: 54
End: 2024-09-27
Payer: MEDICARE

## 2024-09-27 VITALS — HEIGHT: 71 IN | WEIGHT: 282 LBS | BODY MASS INDEX: 39.48 KG/M2 | TEMPERATURE: 97.1 F

## 2024-09-27 DIAGNOSIS — M47.819 FACET ARTHROPATHY: ICD-10-CM

## 2024-09-27 DIAGNOSIS — M51.369 DDD (DEGENERATIVE DISC DISEASE), LUMBAR: Primary | ICD-10-CM

## 2024-10-15 NOTE — TELEPHONE ENCOUNTER
Was the issue that the pharmacy did not stock it and needed to order it or was it too expensive?  Does it need a preauthorization?  Please clarify this with the patient.  At this point other alternatives might be leflunomide which has very similar potential side effects such as methotrexate and is an oral medication.  This might very well improve his arthritis over a period of 4 months but would not treat his psoriasis.  Other alternatives are the Biologics which have more risk of infection but will treat the joints and the psoriasis.  He needs to be aware that any of these medications will only be treating the inflammatory arthritis in his hands etc. and not his chronic back pain secondary to degenerative issues.

## 2024-10-15 NOTE — TELEPHONE ENCOUNTER
Pt states that he still hasn't heard back from anyone re: Otlilia.  Please see message below.  He is needing something cheaper if possible.  Can you please check with Dr. Castellon about this?  Thank you. -CHUCK Causey

## 2024-10-15 NOTE — TELEPHONE ENCOUNTER
Pt has been notified via [employer] of the information provided by Dr. Castellon.  I am awaiting his reply. -CHUCK Causey

## 2024-10-16 NOTE — TELEPHONE ENCOUNTER
Pt replied.  He said the pharmacy told him they can't even order the medication from the supplier. He said that [facility] was the only pharmacy in [place] that had it in stock and they are now out of the medication and not sure if/when they'll get it back in stock. He said he'd be willing to try whichever option [facility] feels will work best.  Also, the neurologist that [facility] referred him to sent him to a spine doctor who's treating his back pain with injections.  -Thuy Degroot, HCUCK

## 2024-11-13 ENCOUNTER — TELEPHONE (OUTPATIENT)
Age: 54
End: 2024-11-13
Payer: MEDICARE

## 2024-11-13 NOTE — TELEPHONE ENCOUNTER
Pt sent message via [employer] stating that he spoke with his insurance about the Otezla and it's going to cost him about $4600 and she can't afford that.  He wants to know if there's another medication he could try that he could possibly get some help with and/or wouldn't be so expensive.  -Thuy Degroot, RMA

## 2024-11-14 NOTE — TELEPHONE ENCOUNTER
Pt contacted to let him know he will need to Apply for LIS and then we would be able to apply for PAP. Also the Gap coverage maybe an option for him. Waiting on a call back from patient

## 2024-11-14 NOTE — TELEPHONE ENCOUNTER
Mail PAP letter explaining LIS. The patient called back and asked that we mail him this.   PAP letter mailed on 11/15. Pt is aware this is coming in the mail and to contact the pharmacy team if Approved for LIS or Denied.

## 2024-11-15 NOTE — PROGRESS NOTES
Office Visit       Date: 11/20/2024   Patient Name: Zoran Lechuga  MRN: [id number]  YOB: 1970    Referring Physician: Abhinav Paredes*     Chief Complaint:   Chief Complaint   Patient presents with   • Injections       History of Present Illness: Zoran Lechuga is a 54 y.o. male who is here today for bilateral knee steroid injections. His knees were both injected last on 8/15/24.  He has failed conservative measures.    Subjective       Past Medical History:   Past Medical History:   Diagnosis Date   • Arthralgia    • Aspiration pneumonia    • Condition not found     pain relief by medication- SP 8/22/2017 pain medication agreement   • Coronary artery disease 01/10/2019    s/p MI 11/15 with one stent. Dr. Rickie Boateng   • COVID-19 01/23/2021   • Fibromyalgia    • Gastritis    • Hip joint pain     bilateral   • Hypothyroidism    • Low back pain    • Lumbosacral disc disease    • Myocardial infarct    • BLADIMIR (obstructive sleep apnea)    • Osteoarthritis of both knees    • Restless leg syndrome        Past Surgical History:   Past Surgical History:   Procedure Laterality Date   • CARPAL TUNNEL RELEASE Right 2019   • CHOLECYSTECTOMY      08/03/2020-02/2020- Gallstones + cholecystitis   • CORONARY ANGIOPLASTY WITH STENT PLACEMENT  12/18/2020    heart stent   • KNEE CARTILAGE SURGERY Left 2009   • WRIST SURGERY      08/03/2020-2019 R CTS REelease; repair of [place] deformity       Family History:   Family History   Problem Relation Age of Onset   • Other Mother         malignant neoplasm of uterus   • Melanoma Brother        Social History:   Social History     Socioeconomic History   • Marital status:    • Number of children: 1   Tobacco Use   • Smoking status: Never     Passive exposure: Never   • Smokeless tobacco: Never   Vaping Use   • Vaping status: Never Used   Substance and Sexual Activity   • Alcohol use: Never   • Drug use: Never   • Sexual activity: Defer        Medications:   Current Outpatient Medications:   •  amLODIPine (NORVASC) 10 MG tablet, Take 1 tablet by mouth Daily., Disp: , Rfl:   •  aspirin 81 MG EC tablet, Take 1 tablet by mouth Daily., Disp: , Rfl:   •  atorvastatin (LIPITOR) 80 MG tablet, Take 99 mg by oral route everyday, Disp: , Rfl:   •  B-D UF III MINI PEN NEEDLES 31G X 5 MM misc, , Disp: , Rfl:   •  baclofen (LIORESAL) 10 MG tablet, Take 1 tablet by mouth 3 (Three) Times a Day., Disp: , Rfl:   •  betamethasone dipropionate (DIPROSONE) 0.05 % cream, Apply by topical route every day a thin layer to the affected areas, Disp: , Rfl:   •  chlorthalidone (HYGROTON) 25 MG tablet, Take 1 tablet by mouth Daily., Disp: , Rfl:   •  clopidogrel (PLAVIX) 75 MG tablet, Take 1 tablet by mouth Daily., Disp: , Rfl:   •  Continuous Glucose Sensor (Dexcom G7 Sensor) misc, , Disp: , Rfl:   •  dapagliflozin Propanediol 10 MG tablet, Take 10 mg by mouth Daily., Disp: , Rfl:   •  Diclofenac Sodium (VOLTAREN) 1 % gel gel, Apply 2 g topically to the appropriate area as directed 2 (Two) Times a Day., Disp: , Rfl:   •  empagliflozin (Jardiance) 25 MG tablet tablet, Take 1 tablet by mouth Every Morning., Disp: , Rfl:   •  folic acid (FOLVITE) 1 MG tablet, Take 1 tablet daily except the day of Methotrexate., Disp: 90 tablet, Rfl: 1  •  fosinopril (MONOPRIL) 40 MG tablet, Take 1 tablet by mouth Daily., Disp: , Rfl:   •  gabapentin (NEURONTIN) 300 MG capsule, Take 3 capsules by mouth 3 (Three) Times a Day., Disp: , Rfl:   •  HumuLIN N KwikPen 100 UNIT/ML injection, INJECT 20UNITS IN THE MORNING AND 26 UNITS IN THE EVENING, Disp: , Rfl:   •  Insulin Lispro, 0.5 Unit Dial, (HumaLOG Asim KwikPen) 100 UNIT/ML solution pen-injector, Inject 10 Units under the skin into the appropriate area as directed. As per insulin protocol, Disp: , Rfl:   •  isosorbide mononitrate (IMDUR) 120 MG 24 hr tablet, Take 1 tablet by mouth Daily., Disp: , Rfl:   •  metFORMIN ER (GLUCOPHAGE-XR) 750 MG 24  "hr tablet, Take 1 tablet by mouth Daily., Disp: , Rfl:   •  methotrexate 2.5 MG tablet, Take 6 tablets by mouth 1 (One) Time Per Week., Disp: 24 tablet, Rfl: 4  •  metoprolol succinate XL (TOPROL-XL) 100 MG 24 hr tablet, Take 1 tablet by mouth Daily., Disp: , Rfl:   •  multivitamin (MULTIVITAMIN PO), Take 1 tablet by mouth Daily., Disp: , Rfl:   •  nitroglycerin (NITROSTAT) 0.4 MG SL tablet, Place 1 tablet under the tongue Every 5 (Five) Minutes As Needed for Chest Pain. Take no more than 3 doses in 15 minutes., Disp: , Rfl:   •  omeprazole (priLOSEC) 40 MG capsule, Take 1 capsule by mouth. Everyday before a meal, Disp: , Rfl:   •  Ozempic, 2 MG/DOSE, 8 MG/3ML solution pen-injector, Inject 2 mg under the skin into the appropriate area as directed Every 7 (Seven) Days., Disp: , Rfl:   •  Probiotic Product (PROBIOTIC-10 PO), Take  by mouth., Disp: , Rfl:   •  traMADol (ULTRAM) 50 MG tablet, Take 1 tablet by mouth Every 6 (Six) Hours As Needed., Disp: , Rfl:   •  traZODone (DESYREL) 150 MG tablet, Take 1 tablet by mouth Every Night., Disp: , Rfl:   •  UltiCare Mini Pen Needles 31G X 6 MM misc, , Disp: , Rfl:   No current facility-administered medications for this visit.    Allergies: No Known Allergies    I reviewed the patient's chief complaint, history of present illness, review of systems, past medical history, surgical history, family history, social history, medications and allergy list.     Objective    Vital Signs:   Vitals:    11/20/24 0851   BP: 128/80   BP Location: Right arm   Pulse: 70   Temp: 98 °F (36.7 °C)   Weight: 131 kg (288 lb 9.6 oz)   Height: 180.3 cm (71\")   PainSc:   6   PainLoc: Back  Comment: knees.       Body mass index is 40.25 kg/m².           Physical Exam:  Physical Exam  Constitutional:       Appearance: Normal appearance.   HENT:      Head: Normocephalic and atraumatic.   Eyes:      Conjunctiva/sclera: Conjunctivae normal.      Pupils: Pupils are equal, round, and reactive to light. "   Cardiovascular:      Rate and Rhythm: Normal rate.   Pulmonary:      Effort: Pulmonary effort is normal.   Musculoskeletal:         General: Normal range of motion.      Cervical back: Normal range of motion.      Comments: Bilateral knees without warmth, erythema, effusion.   Skin:     General: Skin is warm and dry.   Neurological:      General: No focal deficit present.      Mental Status: He is alert and oriented to person, place, and time.   Psychiatric:         Mood and Affect: Mood normal.         Behavior: Behavior normal.         Thought Content: Thought content normal.         Judgment: Judgment normal.      Assessment / Plan    Assessment/Plan:   Diagnoses and all orders for this visit:    1. Bilateral primary osteoarthritis of knee (Primary)  Assessment & Plan:  h/o Synvisc injxn--w/o relief; s/p left knee repair 2010/11    Left knee steroid 5/8/24  Right knee steroid 5/15/24  Bilateral knee steroid: 6/2023, 8/15/24, 11/20/24    He is here today for repeat bilateral knee steroid injections. See procedure documentation and MAR    Orders:  -     Arthrocentesis    Other orders  -     methylPREDNISolone acetate (DEPO-medrol) injection 80 mg  -     methylPREDNISolone acetate (DEPO-medrol) injection 80 mg        Arthrocentesis    Date/Time: 11/20/2024 9:00 AM    Performed by: Rola Park APRN  Authorized by: Rola Park APRN  Indications: pain   Location: Bilateral knee.  Local anesthesia used: yes    Anesthesia:  Local anesthesia used: yes  Local Anesthetic: topical anesthetic (Ethyl chloride)    Sedation:  Patient sedated: no    Preparation: Aseptic technique with iodine site preparation.  Needle size: 22 G  Ultrasound guidance: no  Meds administered: 80 mg methylPREDNISolone acetate (DEPO-medrol) injection 80 mg/mL; 80 mg methylPREDNISolone acetate (DEPO-medrol) injection 80 mg/mL  Patient tolerance: patient tolerated the procedure well with no immediate complications        Follow Up:   Return for  Next scheduled follow up.        DANNIE Yusuf  [facility]

## 2024-11-15 NOTE — ASSESSMENT & PLAN NOTE
h/o Synvisc injxn--w/o relief; s/p left knee repair 2010/11    Left knee steroid 5/8/24  Right knee steroid 5/15/24  Bilateral knee steroid: 6/2023, 8/15/24, 11/20/24    He is here today for repeat bilateral knee steroid injections. See procedure documentation and MAR

## 2024-11-20 ENCOUNTER — CLINICAL SUPPORT (OUTPATIENT)
Age: 54
End: 2024-11-20
Payer: MEDICARE

## 2024-11-20 VITALS
BODY MASS INDEX: 40.4 KG/M2 | TEMPERATURE: 98 F | HEART RATE: 70 BPM | SYSTOLIC BLOOD PRESSURE: 128 MMHG | WEIGHT: 288.6 LBS | DIASTOLIC BLOOD PRESSURE: 80 MMHG | HEIGHT: 71 IN

## 2024-11-20 DIAGNOSIS — M17.0 BILATERAL PRIMARY OSTEOARTHRITIS OF KNEE: Primary | ICD-10-CM

## 2024-11-20 RX ORDER — METHYLPREDNISOLONE ACETATE 80 MG/ML
80 INJECTION, SUSPENSION INTRA-ARTICULAR; INTRALESIONAL; INTRAMUSCULAR; SOFT TISSUE ONCE
Status: COMPLETED | OUTPATIENT
Start: 2024-11-20 | End: 2024-11-20

## 2024-11-20 RX ORDER — TRAZODONE HYDROCHLORIDE 150 MG/1
150 TABLET ORAL NIGHTLY
COMMUNITY
Start: 2024-10-18

## 2024-11-20 RX ADMIN — METHYLPREDNISOLONE ACETATE 80 MG: 80 INJECTION, SUSPENSION INTRA-ARTICULAR; INTRALESIONAL; INTRAMUSCULAR; SOFT TISSUE at 09:01

## 2024-11-20 RX ADMIN — METHYLPREDNISOLONE ACETATE 80 MG: 80 INJECTION, SUSPENSION INTRA-ARTICULAR; INTRALESIONAL; INTRAMUSCULAR; SOFT TISSUE at 09:00

## 2024-11-20 RX ADMIN — METHYLPREDNISOLONE ACETATE 80 MG: 80 INJECTION, SUSPENSION INTRA-ARTICULAR; INTRALESIONAL; INTRAMUSCULAR; SOFT TISSUE at 09:02

## 2025-02-27 ENCOUNTER — TELEPHONE (OUTPATIENT)
Age: 55
End: 2025-02-27

## 2025-02-27 ENCOUNTER — CLINICAL SUPPORT (OUTPATIENT)
Age: 55
End: 2025-02-27
Payer: MEDICARE

## 2025-02-27 VITALS
HEART RATE: 78 BPM | BODY MASS INDEX: 38.92 KG/M2 | HEIGHT: 71 IN | SYSTOLIC BLOOD PRESSURE: 140 MMHG | TEMPERATURE: 97.4 F | WEIGHT: 278 LBS | DIASTOLIC BLOOD PRESSURE: 84 MMHG

## 2025-02-27 DIAGNOSIS — M17.0 BILATERAL PRIMARY OSTEOARTHRITIS OF KNEE: Primary | ICD-10-CM

## 2025-02-27 RX ORDER — METHYLPREDNISOLONE ACETATE 40 MG/ML
40 INJECTION, SUSPENSION INTRA-ARTICULAR; INTRALESIONAL; INTRAMUSCULAR; SOFT TISSUE
Status: COMPLETED | OUTPATIENT
Start: 2025-02-27 | End: 2025-02-27

## 2025-02-27 RX ORDER — LIDOCAINE HYDROCHLORIDE 10 MG/ML
0.5 INJECTION, SOLUTION EPIDURAL; INFILTRATION; INTRACAUDAL; PERINEURAL ONCE
Status: COMPLETED | OUTPATIENT
Start: 2025-02-27 | End: 2025-02-27

## 2025-02-27 RX ORDER — METHYLPREDNISOLONE ACETATE 40 MG/ML
40 INJECTION, SUSPENSION INTRA-ARTICULAR; INTRALESIONAL; INTRAMUSCULAR; SOFT TISSUE ONCE
Status: COMPLETED | OUTPATIENT
Start: 2025-02-27 | End: 2025-02-27

## 2025-02-27 RX ADMIN — METHYLPREDNISOLONE ACETATE 40 MG: 40 INJECTION, SUSPENSION INTRA-ARTICULAR; INTRALESIONAL; INTRAMUSCULAR; SOFT TISSUE at 10:54

## 2025-02-27 RX ADMIN — METHYLPREDNISOLONE ACETATE 40 MG: 40 INJECTION, SUSPENSION INTRA-ARTICULAR; INTRALESIONAL; INTRAMUSCULAR; SOFT TISSUE at 10:55

## 2025-02-27 RX ADMIN — LIDOCAINE HYDROCHLORIDE 0.5 ML: 10 INJECTION, SOLUTION EPIDURAL; INFILTRATION; INTRACAUDAL; PERINEURAL at 10:55

## 2025-02-27 RX ADMIN — LIDOCAINE HYDROCHLORIDE 0.5 ML: 10 INJECTION, SOLUTION EPIDURAL; INFILTRATION; INTRACAUDAL; PERINEURAL at 10:54

## 2025-02-27 NOTE — ASSESSMENT & PLAN NOTE
Bilateral knee steroid injections 2/27/2028    Consent form reviewed and signed  See procedure documentation MAR  Patient can have this done every 3 months as needed.

## 2025-02-27 NOTE — PROGRESS NOTES
Office Visit       Date: 02/27/2025   Patient Name: Zoran Lechuga  MRN: [id number]  YOB: 1970    Referring Physician: Abhinav Paredes*     Chief Complaint:   Chief Complaint   Patient presents with   • Injections       History of Present Illness: Zoran Lechuga is a 55 y.o. male who is here today for bilateral steroid knee injections.  He has not had this done in the last 3 months.  He has failed conservative measures.      Subjective   Review of Systems:  Review of Systems   Musculoskeletal:  Positive for arthralgias.   All other systems reviewed and are negative.       Past Medical History:   Past Medical History:   Diagnosis Date   • Arthralgia    • Aspiration pneumonia    • Condition not found     pain relief by medication- SP 8/22/2017 pain medication agreement   • Coronary artery disease 01/10/2019    s/p MI 11/15 with one stent. Dr. Rickie Boateng   • COVID-19 01/23/2021   • Fibromyalgia    • Gastritis    • Hip joint pain     bilateral   • Hypothyroidism    • Low back pain    • Lumbosacral disc disease    • Myocardial infarct    • BLADIMIR (obstructive sleep apnea)    • Osteoarthritis of both knees    • Restless leg syndrome        Past Surgical History:   Past Surgical History:   Procedure Laterality Date   • CARPAL TUNNEL RELEASE Right 2019   • CHOLECYSTECTOMY      08/03/2020-02/2020- Gallstones + cholecystitis   • CORONARY ANGIOPLASTY WITH STENT PLACEMENT  12/18/2020    heart stent   • KNEE CARTILAGE SURGERY Left 2009   • WRIST SURGERY      08/03/2020-2019 R CTS REelease; repair of [place] deformity       Family History:   Family History   Problem Relation Age of Onset   • Other Mother         malignant neoplasm of uterus   • Melanoma Brother        Social History:   Social History     Socioeconomic History   • Marital status:    • Number of children: 1   Tobacco Use   • Smoking status: Never     Passive exposure: Never   • Smokeless tobacco: Never   Vaping Use    • Vaping status: Never Used   Substance and Sexual Activity   • Alcohol use: Never   • Drug use: Never   • Sexual activity: Defer       Medications:   Current Outpatient Medications:   •  amLODIPine (NORVASC) 10 MG tablet, Take 1 tablet by mouth Daily., Disp: , Rfl:   •  aspirin 81 MG EC tablet, Take 1 tablet by mouth Daily., Disp: , Rfl:   •  atorvastatin (LIPITOR) 80 MG tablet, Take 99 mg by oral route everyday, Disp: , Rfl:   •  B-D UF III MINI PEN NEEDLES 31G X 5 MM misc, , Disp: , Rfl:   •  baclofen (LIORESAL) 10 MG tablet, Take 1 tablet by mouth 3 (Three) Times a Day., Disp: , Rfl:   •  betamethasone dipropionate (DIPROSONE) 0.05 % cream, Apply by topical route every day a thin layer to the affected areas, Disp: , Rfl:   •  chlorthalidone (HYGROTON) 25 MG tablet, Take 1 tablet by mouth Daily., Disp: , Rfl:   •  clopidogrel (PLAVIX) 75 MG tablet, Take 1 tablet by mouth Daily., Disp: , Rfl:   •  Continuous Glucose Sensor (Dexcom G7 Sensor) misc, , Disp: , Rfl:   •  dapagliflozin Propanediol 10 MG tablet, Take 10 mg by mouth Daily., Disp: , Rfl:   •  Diclofenac Sodium (VOLTAREN) 1 % gel gel, Apply 2 g topically to the appropriate area as directed 2 (Two) Times a Day., Disp: , Rfl:   •  empagliflozin (Jardiance) 25 MG tablet tablet, Take 1 tablet by mouth Every Morning., Disp: , Rfl:   •  folic acid (FOLVITE) 1 MG tablet, Take 1 tablet daily except the day of Methotrexate., Disp: 90 tablet, Rfl: 1  •  fosinopril (MONOPRIL) 40 MG tablet, Take 1 tablet by mouth Daily., Disp: , Rfl:   •  gabapentin (NEURONTIN) 300 MG capsule, Take 3 capsules by mouth 3 (Three) Times a Day., Disp: , Rfl:   •  HumuLIN N KwikPen 100 UNIT/ML injection, INJECT 20UNITS IN THE MORNING AND 26 UNITS IN THE EVENING, Disp: , Rfl:   •  Insulin Lispro, 0.5 Unit Dial, (HumaLOG Asim KwikPen) 100 UNIT/ML solution pen-injector, Inject 10 Units under the skin into the appropriate area as directed. As per insulin protocol, Disp: , Rfl:   •   "isosorbide mononitrate (IMDUR) 120 MG 24 hr tablet, Take 1 tablet by mouth Daily., Disp: , Rfl:   •  metFORMIN ER (GLUCOPHAGE-XR) 750 MG 24 hr tablet, Take 1 tablet by mouth Daily., Disp: , Rfl:   •  methotrexate 2.5 MG tablet, Take 6 tablets by mouth 1 (One) Time Per Week., Disp: 24 tablet, Rfl: 4  •  metoprolol succinate XL (TOPROL-XL) 100 MG 24 hr tablet, Take 1 tablet by mouth Daily., Disp: , Rfl:   •  multivitamin (MULTIVITAMIN PO), Take 1 tablet by mouth Daily., Disp: , Rfl:   •  nitroglycerin (NITROSTAT) 0.4 MG SL tablet, Place 1 tablet under the tongue Every 5 (Five) Minutes As Needed for Chest Pain. Take no more than 3 doses in 15 minutes., Disp: , Rfl:   •  omeprazole (priLOSEC) 40 MG capsule, Take 1 capsule by mouth. Everyday before a meal, Disp: , Rfl:   •  Ozempic, 2 MG/DOSE, 8 MG/3ML solution pen-injector, Inject 2 mg under the skin into the appropriate area as directed Every 7 (Seven) Days., Disp: , Rfl:   •  Probiotic Product (PROBIOTIC-10 PO), Take  by mouth., Disp: , Rfl:   •  traMADol (ULTRAM) 50 MG tablet, Take 1 tablet by mouth Every 6 (Six) Hours As Needed., Disp: , Rfl:   •  traZODone (DESYREL) 150 MG tablet, Take 1 tablet by mouth Every Night., Disp: , Rfl:   •  UltiCare Mini Pen Needles 31G X 6 MM misc, , Disp: , Rfl:   No current facility-administered medications for this visit.    Allergies: No Known Allergies    I have reviewed and updated the patient's chief complaint, history of present illness, review of systems, past medical history, surgical history, family history, social history, medications and allergy list as appropriate.     Objective    Vital Signs:   Vitals:    02/27/25 1030   BP: 140/84   Pulse: 78   Temp: 97.4 °F (36.3 °C)   Weight: 126 kg (278 lb)   Height: 180.3 cm (71\")   PainSc: 7    PainLoc: Hip  Comment: legd, knees     Body mass index is 38.77 kg/m².           Physical Exam:  Physical Exam  Constitutional:       Appearance: Normal appearance. He is obese. "   Cardiovascular:      Rate and Rhythm: Normal rate.   Pulmonary:      Effort: Pulmonary effort is normal.   Musculoskeletal:      Comments: Tender bilateral knees   Skin:     General: Skin is warm and dry.   Neurological:      General: No focal deficit present.      Mental Status: He is alert and oriented to person, place, and time. Mental status is at baseline.   Psychiatric:         Mood and Affect: Mood normal.         Behavior: Behavior normal.         Thought Content: Thought content normal.        Results Review:   Imaging Results (Last 24 Hours)       ** No results found for the last 24 hours. **            Arthrocentesis    Date/Time: 2/27/2025 10:54 AM    Performed by: Jared Meyer APRN  Authorized by: Jared Meyer APRN  Indications: pain   Body area: knee  Joint: left knee  Local anesthesia used: yes    Anesthesia:  Local anesthesia used: yes  Local Anesthetic: lidocaine 1% without epinephrine and lidocaine spray  Anesthetic total: 1 mL    Sedation:  Patient sedated: no    Needle gauge: 25.  Ultrasound guidance: no  Meds administered: 40 mg methylPREDNISolone acetate 40 MG/ML  Patient tolerance: patient tolerated the procedure well with no immediate complications      Arthrocentesis    Date/Time: 2/27/2025 10:55 AM    Performed by: Jared Meyer APRN  Authorized by: Jared Meyer APRN  Indications: pain   Body area: knee  Joint: right knee  Local anesthesia used: yes    Anesthesia:  Local anesthesia used: yes  Local Anesthetic: lidocaine 1% without epinephrine and lidocaine spray  Anesthetic total: 1 mL    Sedation:  Patient sedated: no    Needle gauge: 25.  Ultrasound guidance: no  Meds administered: 40 mg methylPREDNISolone acetate 40 MG/ML  Patient tolerance: patient tolerated the procedure well with no immediate complications        Assessment / Plan    Assessment/Plan:   Diagnoses and all orders for this visit:    1. Bilateral primary osteoarthritis of knee  (Primary)  Assessment & Plan:  Bilateral knee steroid injections 2/27/2028    Consent form reviewed and signed  See procedure documentation MAR  Patient can have this done every 3 months as needed.    Orders:  -     methylPREDNISolone acetate (DEPO-medrol) injection 40 mg  -     lidocaine PF 1% (XYLOCAINE) injection 0.5 mL  -     methylPREDNISolone acetate (DEPO-medrol) injection 40 mg  -     lidocaine PF 1% (XYLOCAINE) injection 0.5 mL  -     Arthrocentesis  -     Arthrocentesis        Follow Up:   Return for Next scheduled follow up.        DANNIE Villarreal   Rheumatology [facility]

## 2025-03-18 ENCOUNTER — TELEPHONE (OUTPATIENT)
Age: 55
End: 2025-03-18
Payer: MEDICARE

## 2025-03-18 NOTE — TELEPHONE ENCOUNTER
TRIED CALLING PT TO OFFER INJ APPT FROM WAITLIST. NO ANSWER, DID NOT LEAVE VOICEMAIL. MOVING TO NEXT PATIENT IN LINE.    SHOULD PATIENT CALL BACK AND [facility] STILL HAVE OPENINGS FORWARD TO FRONT OFFICE.    OKAY TO RELAY    -ZC

## 2025-05-14 RX ORDER — BACLOFEN 10 MG/1
10 TABLET ORAL 3 TIMES DAILY
Qty: 270 TABLET | Refills: 0 | Status: SHIPPED | OUTPATIENT
Start: 2025-05-14

## 2025-05-14 NOTE — TELEPHONE ENCOUNTER
Rx Refill Note  Requested Prescriptions     Pending Prescriptions Disp Refills    baclofen (LIORESAL) 10 MG tablet       Sig: Take 1 tablet by mouth 3 (Three) Times a Day.      Last office visit with prescribing clinician: Visit date not found   Last telemedicine visit with prescribing clinician: Visit date not found   Next office visit with prescribing clinician: 7/17/2025     Tamy Gutierrez MA  05/14/25, 13:38 EDT    You have not prescribed this for the patient.

## 2025-07-17 ENCOUNTER — CLINICAL SUPPORT (OUTPATIENT)
Age: 55
End: 2025-07-17
Payer: MEDICARE

## 2025-07-17 VITALS
HEIGHT: 71 IN | TEMPERATURE: 97.8 F | DIASTOLIC BLOOD PRESSURE: 84 MMHG | BODY MASS INDEX: 38.46 KG/M2 | WEIGHT: 274.7 LBS | SYSTOLIC BLOOD PRESSURE: 140 MMHG | HEART RATE: 74 BPM

## 2025-07-17 DIAGNOSIS — M17.0 BILATERAL PRIMARY OSTEOARTHRITIS OF KNEE: Primary | ICD-10-CM

## 2025-07-17 RX ORDER — LIDOCAINE HYDROCHLORIDE 10 MG/ML
1 INJECTION, SOLUTION EPIDURAL; INFILTRATION; INTRACAUDAL; PERINEURAL ONCE
Status: COMPLETED | OUTPATIENT
Start: 2025-07-17 | End: 2025-07-17

## 2025-07-17 RX ORDER — GLIPIZIDE 10 MG/1
10 TABLET ORAL
COMMUNITY
Start: 2025-08-01 | End: 2026-08-01

## 2025-07-17 RX ORDER — METHYLPREDNISOLONE ACETATE 40 MG/ML
80 INJECTION, SUSPENSION INTRA-ARTICULAR; INTRALESIONAL; INTRAMUSCULAR; SOFT TISSUE
Status: COMPLETED | OUTPATIENT
Start: 2025-07-17 | End: 2025-07-17

## 2025-07-17 RX ORDER — METHYLPREDNISOLONE ACETATE 80 MG/ML
80 INJECTION, SUSPENSION INTRA-ARTICULAR; INTRALESIONAL; INTRAMUSCULAR; SOFT TISSUE ONCE
Status: COMPLETED | OUTPATIENT
Start: 2025-07-17 | End: 2025-07-17

## 2025-07-17 RX ADMIN — METHYLPREDNISOLONE ACETATE 80 MG: 40 INJECTION, SUSPENSION INTRA-ARTICULAR; INTRALESIONAL; INTRAMUSCULAR; SOFT TISSUE at 09:08

## 2025-07-17 RX ADMIN — LIDOCAINE HYDROCHLORIDE 1 ML: 10 INJECTION, SOLUTION EPIDURAL; INFILTRATION; INTRACAUDAL; PERINEURAL at 09:08

## 2025-07-17 RX ADMIN — LIDOCAINE HYDROCHLORIDE 1 ML: 10 INJECTION, SOLUTION EPIDURAL; INFILTRATION; INTRACAUDAL; PERINEURAL at 09:09

## 2025-07-17 RX ADMIN — METHYLPREDNISOLONE ACETATE 80 MG: 40 INJECTION, SUSPENSION INTRA-ARTICULAR; INTRALESIONAL; INTRAMUSCULAR; SOFT TISSUE at 09:09

## 2025-07-17 RX ADMIN — METHYLPREDNISOLONE ACETATE 80 MG: 80 INJECTION, SUSPENSION INTRA-ARTICULAR; INTRALESIONAL; INTRAMUSCULAR; SOFT TISSUE at 09:09

## 2025-07-17 RX ADMIN — METHYLPREDNISOLONE ACETATE 80 MG: 80 INJECTION, SUSPENSION INTRA-ARTICULAR; INTRALESIONAL; INTRAMUSCULAR; SOFT TISSUE at 09:08

## 2025-07-17 NOTE — PROGRESS NOTES
Office Visit       Date: 07/17/2025   Patient Name: Zoran Lechuga  MRN: [id number]  YOB: 1970    Referring Physician: No ref. provider found     Chief Complaint:   Chief Complaint   Patient presents with    Bilateral Knee Injection       History of Present Illness: Zoran Lechuga is a 55 y.o. male who is here today for bilateral knee steroid injections.  He last had this done February 2025.  He has failed conservative measures.  He does find steroid injections very beneficial for his knee pain.      Subjective   Review of Systems:  Review of Systems   Musculoskeletal:  Positive for arthralgias.   All other systems reviewed and are negative.       Past Medical History:   Past Medical History:   Diagnosis Date    Arthralgia     Aspiration pneumonia     Condition not found     pain relief by medication- SP 8/22/2017 pain medication agreement    Coronary artery disease 01/10/2019    s/p MI 11/15 with one stent. Dr. Rickie Boateng    COVID-19 01/23/2021    Fibromyalgia     Gastritis     Hip joint pain     bilateral    Hypothyroidism     Low back pain     Lumbosacral disc disease     Myocardial infarct     BLADIMIR (obstructive sleep apnea)     Osteoarthritis of both knees     Restless leg syndrome        Past Surgical History:   Past Surgical History:   Procedure Laterality Date    CARPAL TUNNEL RELEASE Right 2019    CHOLECYSTECTOMY      08/03/2020-02/2020- Gallstones + cholecystitis    CORONARY ANGIOPLASTY WITH STENT PLACEMENT  12/18/2020    heart stent    KNEE CARTILAGE SURGERY Left 2009    WRIST SURGERY      08/03/2020-2019 R [facility]; repair of [place] deformity       Family History:   Family History   Problem Relation Age of Onset    Other Mother         malignant neoplasm of uterus    Melanoma Brother        Social History:   Social History     Socioeconomic History    Marital status:     Number of children: 1   Tobacco Use    Smoking status: Never     Passive exposure: Never     Smokeless tobacco: Never   Vaping Use    Vaping status: Never Used   Substance and Sexual Activity    Alcohol use: Never    Drug use: Never    Sexual activity: Defer       Medications:   Current Outpatient Medications:     amLODIPine (NORVASC) 10 MG tablet, Take 1 tablet by mouth Daily., Disp: , Rfl:     aspirin 81 MG EC tablet, Take 1 tablet by mouth Daily., Disp: , Rfl:     atorvastatin (LIPITOR) 80 MG tablet, Take 99 mg by oral route everyday, Disp: , Rfl:     B-D UF III MINI PEN NEEDLES 31G X 5 MM misc, , Disp: , Rfl:     baclofen (LIORESAL) 10 MG tablet, Take 1 tablet by mouth 3 (Three) Times a Day., Disp: 270 tablet, Rfl: 0    betamethasone dipropionate (DIPROSONE) 0.05 % cream, Apply by topical route every day a thin layer to the affected areas, Disp: , Rfl:     chlorthalidone (HYGROTON) 25 MG tablet, Take 1 tablet by mouth Daily., Disp: , Rfl:     clopidogrel (PLAVIX) 75 MG tablet, Take 1 tablet by mouth Daily., Disp: , Rfl:     Continuous Glucose Sensor (Dexcom G7 Sensor) misc, , Disp: , Rfl:     Diclofenac Sodium (VOLTAREN) 1 % gel gel, Apply 2 g topically to the appropriate area as directed 2 (Two) Times a Day., Disp: , Rfl:     folic acid (FOLVITE) 1 MG tablet, Take 1 tablet daily except the day of Methotrexate., Disp: 90 tablet, Rfl: 1    gabapentin (NEURONTIN) 300 MG capsule, Take 3 capsules by mouth 3 (Three) Times a Day., Disp: , Rfl:     [START ON 8/1/2025] glipizide (GLUCOTROL) 10 MG tablet, Take 1 tablet by mouth., Disp: , Rfl:     Insulin Lispro, 0.5 Unit Dial, (HumaLOG Asim KwikPen) 100 UNIT/ML solution pen-injector, Inject 10 Units under the skin into the appropriate area as directed. As per insulin protocol, Disp: , Rfl:     isosorbide mononitrate (IMDUR) 120 MG 24 hr tablet, Take 1 tablet by mouth Daily., Disp: , Rfl:     metFORMIN ER (GLUCOPHAGE-XR) 750 MG 24 hr tablet, Take 1 tablet by mouth Daily., Disp: , Rfl:     methotrexate 2.5 MG tablet, Take 6 tablets by mouth 1 (One) Time Per  Week., Disp: 24 tablet, Rfl: 4    metoprolol succinate XL (TOPROL-XL) 100 MG 24 hr tablet, Take 1 tablet by mouth Daily., Disp: , Rfl:     multivitamin (MULTIVITAMIN PO), Take 1 tablet by mouth Daily., Disp: , Rfl:     nitroglycerin (NITROSTAT) 0.4 MG SL tablet, Place 1 tablet under the tongue Every 5 (Five) Minutes As Needed for Chest Pain. Take no more than 3 doses in 15 minutes., Disp: , Rfl:     omeprazole (priLOSEC) 40 MG capsule, Take 1 capsule by mouth. Everyday before a meal, Disp: , Rfl:     Ozempic, 2 MG/DOSE, 8 MG/3ML solution pen-injector, Inject 2 mg under the skin into the appropriate area as directed Every 7 (Seven) Days., Disp: , Rfl:     Probiotic Product (PROBIOTIC-10 PO), Take  by mouth., Disp: , Rfl:     traMADol (ULTRAM) 50 MG tablet, Take 1 tablet by mouth Every 6 (Six) Hours As Needed., Disp: , Rfl:     traZODone (DESYREL) 150 MG tablet, Take 1 tablet by mouth Every Night., Disp: , Rfl:     UltiCare Mini Pen Needles 31G X 6 MM misc, , Disp: , Rfl:     dapagliflozin Propanediol 10 MG tablet, Take 10 mg by mouth Daily., Disp: , Rfl:     empagliflozin (Jardiance) 25 MG tablet tablet, Take 1 tablet by mouth Every Morning. (Patient not taking: Reported on 7/17/2025), Disp: , Rfl:     fosinopril (MONOPRIL) 40 MG tablet, Take 1 tablet by mouth Daily., Disp: , Rfl:     HumuLIN N KwikPen 100 UNIT/ML injection, INJECT 20UNITS IN THE MORNING AND 26 UNITS IN THE EVENING, Disp: , Rfl:   No current facility-administered medications for this visit.    Allergies: No Known Allergies    I have reviewed and updated the patient's chief complaint, history of present illness, review of systems, past medical history, surgical history, family history, social history, medications and allergy list as appropriate.     Objective    Vital Signs:   Vitals:    07/17/25 0847   BP: 140/84   BP Location: Left arm   Patient Position: Sitting   Cuff Size: Adult   Pulse: 74   Temp: 97.8 °F (36.6 °C)   TempSrc: Skin   Weight: 125  "kg (274 lb 11.2 oz)   Height: 180.3 cm (71\")   PainSc: 7    PainLoc: Knee     Body mass index is 38.31 kg/m².           Physical Exam:  Physical Exam  Constitutional:       Appearance: Normal appearance.   Cardiovascular:      Rate and Rhythm: Normal rate.   Pulmonary:      Effort: Pulmonary effort is normal.   Musculoskeletal:      Comments: Tender bilateral knees   Skin:     General: Skin is warm and dry.   Neurological:      General: No focal deficit present.      Mental Status: He is alert and oriented to person, place, and time. Mental status is at baseline.   Psychiatric:         Mood and Affect: Mood normal.         Behavior: Behavior normal.         Thought Content: Thought content normal.          Results Review:   Imaging Results (Last 24 Hours)       ** No results found for the last 24 hours. **            Arthrocentesis    Date/Time: 7/17/2025 9:08 AM    Performed by: Jared Meyer APRN  Authorized by: Jared Meyer APRN  Indications: pain   Body area: knee  Joint: left knee  Local anesthesia used: yes    Anesthesia:  Local anesthesia used: yes  Local Anesthetic: lidocaine 1% without epinephrine and lidocaine spray  Anesthetic total: 1 mL    Sedation:  Patient sedated: no    Preparation: Patient was prepped and draped in the usual sterile fashion.  Needle gauge: 25.  Ultrasound guidance: no  Meds administered: 80 mg methylPREDNISolone acetate 40 MG/ML  Patient tolerance: patient tolerated the procedure well with no immediate complications      Arthrocentesis    Date/Time: 7/17/2025 9:09 AM    Performed by: Jared Meyer APRN  Authorized by: Jared Meyer APRN  Indications: pain   Body area: knee  Joint: right knee  Local anesthesia used: yes    Anesthesia:  Local anesthesia used: yes  Local Anesthetic: lidocaine 1% without epinephrine and lidocaine spray  Anesthetic total: 1 mL    Sedation:  Patient sedated: no    Preparation: Patient was prepped and draped in the " usual sterile fashion.  Needle gauge: 25.  Ultrasound guidance: no  Meds administered: 80 mg methylPREDNISolone acetate 40 MG/ML  Patient tolerance: patient tolerated the procedure well with no immediate complications          Assessment / Plan    Assessment/Plan:   Diagnoses and all orders for this visit:    1. Bilateral primary osteoarthritis of knee (Primary)  Assessment & Plan:  Bilateral knee steroid injections 7/17/25    Consent form reviewed and signed  See procedure documentation MAR  Patient can have this done every 3 months as needed.    Orders:  -     lidocaine PF 1% (XYLOCAINE) injection 1 mL  -     methylPREDNISolone acetate (DEPO-medrol) injection 80 mg  -     lidocaine PF 1% (XYLOCAINE) injection 1 mL  -     methylPREDNISolone acetate (DEPO-medrol) injection 80 mg    Other orders  -     Arthrocentesis  -     Arthrocentesis        Follow Up:   Return for Next scheduled follow up.        DANNIE Villarreal   [facility]

## 2025-07-17 NOTE — ASSESSMENT & PLAN NOTE
Bilateral knee steroid injections 7/17/25    Consent form reviewed and signed  See procedure documentation MAR  Patient can have this done every 3 months as needed.

## 2025-07-23 NOTE — PROGRESS NOTES
Office Visit       Date: 08/01/2025   Patient Name: Zoran Lechuga  MRN: [id number]  YOB: 1970    Referring Physician: No ref. provider found     Chief Complaint:   Chief Complaint   Patient presents with    Psoriatic Arthritis       History of Present Illness: Zoran Lechuga is a 55 y.o. male who is here today for follow up of PsA, OA, and FMS.     He was last seen for a follow up visit 4/2024 with Dr. Castellon.    Am stiffness 2.5 hours.  Pain 7/10.  We previously prescribed him methotrexate 8 tablets weekly.  He did not notice much improvement on methotrexate so he has weaned down to 2 tablets weekly.  He has pain and swelling his right third finger.  He would like to consider injection for his finger.  He is not currently interested in starting any DMARD or biologic.  He reports FMS has been flaring, mostly in his chest muscles.  We previously prescribed him gabapentin and baclofen, which he is now getting from his PCP.  He also takes tramadol as needed.  He has had bilateral knee injections most recently 7/17/25.  The injections are helpful.  He has chronic back pain.  He has seen neurosurgery and was recommended to see pain management.  He was scheduled for an ablation this past week, but the office called him and told him they no longer took his insurance.  He is going to talk to his PCP about getting a referral for a new pain management specialist.      Subjective   Review of systems:  Review of Systems   HENT:  Positive for rhinorrhea and tinnitus.    Gastrointestinal:  Positive for constipation, diarrhea and nausea.   Musculoskeletal:  Positive for arthralgias, back pain, joint swelling, myalgias, neck pain and neck stiffness.   Neurological:  Positive for headaches.   Hematological:  Bruises/bleeds easily.   All other systems reviewed and are negative.      Past Medical History:   Past Medical History:   Diagnosis Date    Arthralgia     Aspiration pneumonia     Condition  not found     pain relief by medication- SP 8/22/2017 pain medication agreement    Coronary artery disease 01/10/2019    s/p MI 11/15 with one stent. Dr. Rickie Boateng    COVID-19 01/23/2021    Fibromyalgia     Gastritis     Hip joint pain     bilateral    Hypothyroidism     Low back pain     Lumbosacral disc disease     Myocardial infarct     BLADIMIR (obstructive sleep apnea)     Osteoarthritis of both knees     Restless leg syndrome        Past Surgical History:   Past Surgical History:   Procedure Laterality Date    CARPAL TUNNEL RELEASE Right 2019    CHOLECYSTECTOMY      08/03/2020-02/2020- Gallstones + cholecystitis    CORONARY ANGIOPLASTY WITH STENT PLACEMENT  12/18/2020    heart stent    KNEE CARTILAGE SURGERY Left 2009    WRIST SURGERY      08/03/2020-2019 R CTS REelease; repair of [place] deformity       Family History:   Family History   Problem Relation Age of Onset    Other Mother         malignant neoplasm of uterus    Melanoma Brother        Social History:   Social History     Socioeconomic History    Marital status:     Number of children: 1   Tobacco Use    Smoking status: Never     Passive exposure: Never    Smokeless tobacco: Never   Vaping Use    Vaping status: Never Used   Substance and Sexual Activity    Alcohol use: Never    Drug use: Never    Sexual activity: Defer       Medications:   Current Outpatient Medications:     amLODIPine (NORVASC) 10 MG tablet, Take 1 tablet by mouth Daily., Disp: , Rfl:     aspirin 81 MG EC tablet, Take 1 tablet by mouth Daily., Disp: , Rfl:     atorvastatin (LIPITOR) 80 MG tablet, Take 99 mg by oral route everyday, Disp: , Rfl:     B-D UF III MINI PEN NEEDLES 31G X 5 MM misc, , Disp: , Rfl:     baclofen (LIORESAL) 10 MG tablet, Take 1 tablet by mouth 3 (Three) Times a Day., Disp: 270 tablet, Rfl: 0    betamethasone dipropionate (DIPROSONE) 0.05 % cream, Apply by topical route every day a thin layer to the affected areas, Disp: , Rfl:     chlorthalidone  (HYGROTON) 25 MG tablet, Take 1 tablet by mouth Daily., Disp: , Rfl:     clopidogrel (PLAVIX) 75 MG tablet, Take 1 tablet by mouth Daily., Disp: , Rfl:     Continuous Glucose Sensor (Dexcom G7 Sensor) misc, , Disp: , Rfl:     Diclofenac Sodium (VOLTAREN) 1 % gel gel, Apply 2 g topically to the appropriate area as directed 2 (Two) Times a Day., Disp: , Rfl:     gabapentin (NEURONTIN) 300 MG capsule, Take 3 capsules by mouth 3 (Three) Times a Day., Disp: , Rfl:     glipizide (GLUCOTROL) 10 MG tablet, Take 2 tablets by mouth., Disp: , Rfl:     Insulin Lispro, 0.5 Unit Dial, (HumaLOG Asim KwikPen) 100 UNIT/ML solution pen-injector, Inject 10 Units under the skin into the appropriate area as directed. As per insulin protocol, Disp: , Rfl:     metoprolol succinate XL (TOPROL-XL) 100 MG 24 hr tablet, Take 1 tablet by mouth Daily., Disp: , Rfl:     multivitamin (MULTIVITAMIN PO), Take 1 tablet by mouth Daily., Disp: , Rfl:     nitroglycerin (NITROSTAT) 0.4 MG SL tablet, Place 1 tablet under the tongue Every 5 (Five) Minutes As Needed for Chest Pain. Take no more than 3 doses in 15 minutes., Disp: , Rfl:     omeprazole (priLOSEC) 40 MG capsule, Take 1 capsule by mouth. Everyday before a meal, Disp: , Rfl:     Ozempic, 2 MG/DOSE, 8 MG/3ML solution pen-injector, Inject 2 mg under the skin into the appropriate area as directed Every 7 (Seven) Days., Disp: , Rfl:     Probiotic Product (PROBIOTIC-10 PO), Take  by mouth., Disp: , Rfl:     traMADol (ULTRAM) 50 MG tablet, Take 1 tablet by mouth Every 6 (Six) Hours As Needed., Disp: , Rfl:     traZODone (DESYREL) 150 MG tablet, Take 1 tablet by mouth Every Night., Disp: , Rfl:     UltiCare Mini Pen Needles 31G X 6 MM misc, , Disp: , Rfl:     dapagliflozin Propanediol 10 MG tablet, Take 10 mg by mouth Daily., Disp: , Rfl:     fosinopril (MONOPRIL) 40 MG tablet, Take 1 tablet by mouth Daily., Disp: , Rfl:     HumuLIN N KwikPen 100 UNIT/ML injection, INJECT 20UNITS IN THE MORNING AND  "26 UNITS IN THE EVENING, Disp: , Rfl:     isosorbide mononitrate (IMDUR) 120 MG 24 hr tablet, Take 1 tablet by mouth Daily., Disp: , Rfl:     metFORMIN ER (GLUCOPHAGE-XR) 750 MG 24 hr tablet, Take 1 tablet by mouth Daily., Disp: , Rfl:     Allergies: No Known Allergies    I reviewed the patient's chief complaint, history of present illness, review of systems, past medical history, surgical history, family history, social history, medications and allergy list.     Objective    Vital Signs:   Vitals:    08/01/25 0848   BP: 138/90   BP Location: Left arm   Patient Position: Sitting   Cuff Size: Adult   Pulse: 72   Temp: 97.4 °F (36.3 °C)   TempSrc: Skin   Weight: 125 kg (274 lb 9.6 oz)   Height: 180.3 cm (71\")   PainSc: 7    PainLoc: Comment: CHEST RELATED TO FIBRO- NOT CARDIAC PER PT     Body mass index is 38.3 kg/m².   Defer to PCP         Physical Exam:  Physical Exam  Constitutional:       Appearance: Normal appearance.   HENT:      Head: Normocephalic and atraumatic.   Eyes:      Conjunctiva/sclera: Conjunctivae normal.      Pupils: Pupils are equal, round, and reactive to light.   Cardiovascular:      Rate and Rhythm: Normal rate.   Pulmonary:      Effort: Pulmonary effort is normal.   Musculoskeletal:         General: Normal range of motion.      Cervical back: Normal range of motion.      Comments: Crepitus of the knees.  Dactylitis R 3rd digit.  He has swelling throughout his right MCP joints.  He can make about 50% fist on the right.  100% fist on the left.   Skin:     General: Skin is warm and dry.   Neurological:      General: No focal deficit present.      Mental Status: He is alert and oriented to person, place, and time.   Psychiatric:         Mood and Affect: Mood normal.         Behavior: Behavior normal.         Thought Content: Thought content normal.         Judgment: Judgment normal.            Assessment / Plan      Assessment & Plan  PSA (psoriatic arthritis)  Multiple joints mildly tender. He " has had + Stiffness in the morning for 2-3 hours.  Distribution concerning for inflammatory arthritis in the hands.  ++ dactylitis right 3rd digit  Crepitus in the hands with flexion.  Positive trigger thumbs.  KEVIN, CCP, RF3 negative August 2017.  Repeat RF3, CCP, CRP, ESR were negative, KEVIN was low positive 1:80 (not significant) in 2021.   Started Plaquenil trial in 12/18. Stopped 2/2020 due to suspected ocular toxicity.   He states his cardiologist doesn't want him to take  NSAIDs, tramadol, or steroids.  12/23 hand and foot films negative for erosions  MTX not helpful    He has been taking 5 mg of methotrexate weekly.  He reports 20 mg dose weekly was not helpful for his joints.  Stop methotrexate.  We discussed possible addition of leflunomide.  A handout was provided.  Risks and benefits were discussed.  He is not currently interested in starting DMARD or biologic therapy at this time.  He can let us know if he changes his mind.  We will request recent labs from July 2025 from his PCP  We will arrange for right third MCP and PIP injection.  Could also consider for some taper of steroids by mouth, but he declined today.  RTC 4 months  High risk medication use  Consider leflunomide trial     CBC, CMP every 8-12 weeks if on leflunomide.  1/24/25 stable. Request PCP labs 7/2025.    We have discussed the side effects of leflunomide including but not limited to rash, GI upset, hematologic and liver abnormalities.    Degeneration of intervertebral disc of lumbar region, unspecified whether pain present  h/o epidural in 2003.   S/p PT in the past.   MRI lumbar spine 12/23 - multilevel DDD with facet arthritis. Moderate central and foraminal stenosis at L4-5. Mild central and L foraminal stenosis at L5-S1.   PT and exercise worsens back pain.   The more he walks, the worse his pain is.   Back stiffness every morning for 3-4 hours.   Positive radicular sx bilaterally  NS did not want to operate on him and recommended  pain management    He was supposed to have an ablation at [facility] [place], but they told him they will no longer take his insurance so he is now looking for new pain management. His PCP will refer.   Recommend he discuss gabapentin dosing with his PCP  Continue baclofen and tramadol per PCP  Bilateral primary osteoarthritis of knee  Chronic stiffness and pain  h/o Synvisc injection--w/o relief  s/p left knee repair 2010/11  Bilateral knee steroid: 6/2023, 5/2024, 8/15/24, 11/20/24, 7/17/25    Can inject every 3-4 months as needed. He is scheduled for repeat knee injections 10/21/25 and 1/19/26.  Fibromyalgia  Intermittent muscle pain  + myofascial tenderness  S/p tramadol, gabapentin    Continue baclofen, now from PCP  He continues on gabapentin, now from PCP.  Recommend discuss dosing with them.  Chair aerobics or chair yoga, aquatherapy are good options  Biofreeze, CBD oil or lotion.   Voltaren is ok to use on the wrist.   Left shoulder pain, unspecified chronicity  XR L shoulder normal 6/23  S/p injection June 2023    This is currently stable.    Can call to schedule if he needs this.   If sx worsen next step is MRI.  Encounter for medication monitoring  He is now getting gabapentin from his PCP      Discussed plan of care in detail with the patient today.  Patient verbalized understanding and agrees.    I confirm accuracy of unchanged data/findings which have been carried forward from previous visit.  I have updated appropriately those that have changed.      Follow Up:   Return in about 4 months (around 12/1/2025) for DANNIE Yusuf, DANNIE Brooke.        DANNIE Yusuf  [facility]

## 2025-07-24 PROBLEM — Z79.899 HIGH RISK MEDICATION USE: Status: ACTIVE | Noted: 2025-07-24

## 2025-07-24 PROBLEM — M79.7 FIBROMYALGIA: Status: ACTIVE | Noted: 2025-07-24

## 2025-07-24 PROBLEM — M51.369 DEGENERATION OF INTERVERTEBRAL DISC OF LUMBAR REGION: Status: ACTIVE | Noted: 2025-07-24

## 2025-07-24 PROBLEM — L40.50 PSA (PSORIATIC ARTHRITIS): Status: ACTIVE | Noted: 2025-07-24

## 2025-07-24 NOTE — ASSESSMENT & PLAN NOTE
Consider leflunomide trial     CBC, CMP every 8-12 weeks if on leflunomide.  1/24/25 stable. Request PCP labs 7/2025.    We have discussed the side effects of leflunomide including but not limited to rash, GI upset, hematologic and liver abnormalities.

## 2025-07-24 NOTE — ASSESSMENT & PLAN NOTE
Return to the ED for chest pain, shortness of breath or if worse in any way.    h/o epidural in 2003.   S/p PT in the past.   MRI lumbar spine 12/23 - multilevel DDD with facet arthritis. Moderate central and foraminal stenosis at L4-5. Mild central and L foraminal stenosis at L5-S1.   PT and exercise worsens back pain.   The more he walks, the worse his pain is.   Back stiffness every morning for 3-4 hours.   Positive radicular sx bilaterally  NS did not want to operate on him and recommended pain management    He was supposed to have an ablation at [facility] in [place], but they told him they will no longer take his insurance so he is now looking for new pain management. His PCP will refer.   Recommend he discuss gabapentin dosing with his PCP  Continue baclofen and tramadol per PCP

## 2025-07-24 NOTE — ASSESSMENT & PLAN NOTE
Chronic stiffness and pain  h/o Synvisc injection--w/o relief  s/p left knee repair 2010/11  Bilateral knee steroid: 6/2023, 5/2024, 8/15/24, 11/20/24, 7/17/25    Can inject every 3-4 months as needed. He is scheduled for repeat knee injections 10/21/25 and 1/19/26.

## 2025-07-24 NOTE — ASSESSMENT & PLAN NOTE
Intermittent muscle pain  + myofascial tenderness  S/p tramadol, gabapentin    Continue baclofen, now from PCP  He continues on gabapentin, now from PCP.  Recommend discuss dosing with them.  Chair aerobics or chair yoga, aquatherapy are good options  Biofreeze, CBD oil or lotion.   Voltaren is ok to use on the wrist.

## 2025-07-24 NOTE — ASSESSMENT & PLAN NOTE
XR L shoulder normal 6/23  S/p injection June 2023    This is currently stable.    Can call to schedule if he needs this.   If sx worsen next step is MRI.

## 2025-07-24 NOTE — ASSESSMENT & PLAN NOTE
Multiple joints mildly tender. He has had + Stiffness in the morning for 2-3 hours.  Distribution concerning for inflammatory arthritis in the hands.  ++ dactylitis right 3rd digit  Crepitus in the hands with flexion.  Positive trigger thumbs.  KEVIN, CCP, RF3 negative August 2017.  Repeat RF3, CCP, CRP, ESR were negative, KEVIN was low positive 1:80 (not significant) in 2021.   Started Plaquenil trial in 12/18. Stopped 2/2020 due to suspected ocular toxicity.   He states his cardiologist doesn't want him to take  NSAIDs, tramadol, or steroids.  12/23 hand and foot films negative for erosions  MTX not helpful    He has been taking 5 mg of methotrexate weekly.  He reports 20 mg dose weekly was not helpful for his joints.  Stop methotrexate.  We discussed possible addition of leflunomide.  A handout was provided.  Risks and benefits were discussed.  He is not currently interested in starting DMARD or biologic therapy at this time.  He can let us know if he changes his mind.  We will request recent labs from July 2025 from his PCP  We will arrange for right third MCP and PIP injection.  Could also consider for some taper of steroids by mouth, but he declined today.  RTC 4 months

## 2025-08-01 ENCOUNTER — TELEPHONE (OUTPATIENT)
Age: 55
End: 2025-08-01

## 2025-08-01 ENCOUNTER — OFFICE VISIT (OUTPATIENT)
Age: 55
End: 2025-08-01
Payer: MEDICARE

## 2025-08-01 VITALS
HEIGHT: 71 IN | DIASTOLIC BLOOD PRESSURE: 90 MMHG | BODY MASS INDEX: 38.44 KG/M2 | HEART RATE: 72 BPM | SYSTOLIC BLOOD PRESSURE: 138 MMHG | WEIGHT: 274.6 LBS | TEMPERATURE: 97.4 F

## 2025-08-01 DIAGNOSIS — M25.542 ARTHRALGIA OF BOTH HANDS: ICD-10-CM

## 2025-08-01 DIAGNOSIS — M25.512 LEFT SHOULDER PAIN, UNSPECIFIED CHRONICITY: ICD-10-CM

## 2025-08-01 DIAGNOSIS — M51.369 DEGENERATION OF INTERVERTEBRAL DISC OF LUMBAR REGION, UNSPECIFIED WHETHER PAIN PRESENT: ICD-10-CM

## 2025-08-01 DIAGNOSIS — M17.0 BILATERAL PRIMARY OSTEOARTHRITIS OF KNEE: ICD-10-CM

## 2025-08-01 DIAGNOSIS — M79.7 FIBROMYALGIA: ICD-10-CM

## 2025-08-01 DIAGNOSIS — Z51.81 ENCOUNTER FOR MEDICATION MONITORING: ICD-10-CM

## 2025-08-01 DIAGNOSIS — L40.50 PSA (PSORIATIC ARTHRITIS): Primary | ICD-10-CM

## 2025-08-01 DIAGNOSIS — Z79.899 HIGH RISK MEDICATION USE: ICD-10-CM

## 2025-08-01 DIAGNOSIS — M25.541 ARTHRALGIA OF BOTH HANDS: ICD-10-CM

## 2025-08-01 PROBLEM — Z95.5 S/P CORONARY ARTERY STENT PLACEMENT: Status: ACTIVE | Noted: 2019-11-21

## 2025-08-01 RX ORDER — FOLIC ACID 1 MG/1
TABLET ORAL
Qty: 90 TABLET | Refills: 1 | Status: CANCELLED | OUTPATIENT
Start: 2025-08-01

## 2025-08-01 RX ORDER — BACLOFEN 10 MG/1
10 TABLET ORAL 3 TIMES DAILY
Qty: 270 TABLET | Refills: 0 | Status: CANCELLED | OUTPATIENT
Start: 2025-08-01

## 2025-08-01 RX ORDER — METHOTREXATE 2.5 MG/1
15 TABLET ORAL WEEKLY
Qty: 24 TABLET | Refills: 4 | Status: CANCELLED | OUTPATIENT
Start: 2025-08-01

## 2025-08-01 NOTE — TELEPHONE ENCOUNTER
I called PCP's office and was told to fax request to them at [phone].  I faxed request for most recent labs. -CHUCK Causey   Perez Handley

## 2025-08-04 ENCOUNTER — RESULTS FOLLOW-UP (OUTPATIENT)
Age: 55
End: 2025-08-04
Payer: MEDICARE

## 2025-08-04 PROBLEM — M17.9 OSTEOARTHRITIS OF KNEE: Status: ACTIVE | Noted: 2025-08-04

## 2025-08-07 ENCOUNTER — TELEPHONE (OUTPATIENT)
Age: 55
End: 2025-08-07

## 2025-08-07 ENCOUNTER — CLINICAL SUPPORT (OUTPATIENT)
Age: 55
End: 2025-08-07
Payer: MEDICARE

## 2025-08-07 VITALS
HEART RATE: 74 BPM | DIASTOLIC BLOOD PRESSURE: 80 MMHG | SYSTOLIC BLOOD PRESSURE: 130 MMHG | HEIGHT: 71 IN | BODY MASS INDEX: 38.72 KG/M2 | TEMPERATURE: 96.7 F | WEIGHT: 276.6 LBS

## 2025-08-07 DIAGNOSIS — L40.50 PSA (PSORIATIC ARTHRITIS): Primary | ICD-10-CM

## 2025-08-07 DIAGNOSIS — M25.541 ARTHRALGIA OF BOTH HANDS: Primary | ICD-10-CM

## 2025-08-07 DIAGNOSIS — Z79.899 HIGH RISK MEDICATION USE: ICD-10-CM

## 2025-08-07 DIAGNOSIS — M25.542 ARTHRALGIA OF BOTH HANDS: Primary | ICD-10-CM

## 2025-08-07 RX ORDER — METHYLPREDNISOLONE ACETATE 40 MG/ML
20 INJECTION, SUSPENSION INTRA-ARTICULAR; INTRALESIONAL; INTRAMUSCULAR; SOFT TISSUE
Status: COMPLETED | OUTPATIENT
Start: 2025-08-07 | End: 2025-08-07

## 2025-08-07 RX ORDER — LEFLUNOMIDE 20 MG/1
20 TABLET ORAL DAILY
Qty: 90 TABLET | Refills: 1 | Status: SHIPPED | OUTPATIENT
Start: 2025-08-07

## 2025-08-07 RX ORDER — METHYLPREDNISOLONE ACETATE 80 MG/ML
20 INJECTION, SUSPENSION INTRA-ARTICULAR; INTRALESIONAL; INTRAMUSCULAR; SOFT TISSUE ONCE
Status: COMPLETED | OUTPATIENT
Start: 2025-08-07 | End: 2025-08-07

## 2025-08-07 RX ADMIN — METHYLPREDNISOLONE ACETATE 20 MG: 40 INJECTION, SUSPENSION INTRA-ARTICULAR; INTRALESIONAL; INTRAMUSCULAR; SOFT TISSUE at 08:42

## 2025-08-07 RX ADMIN — METHYLPREDNISOLONE ACETATE 20 MG: 80 INJECTION, SUSPENSION INTRA-ARTICULAR; INTRALESIONAL; INTRAMUSCULAR; SOFT TISSUE at 08:43

## 2025-08-07 RX ADMIN — METHYLPREDNISOLONE ACETATE 20 MG: 40 INJECTION, SUSPENSION INTRA-ARTICULAR; INTRALESIONAL; INTRAMUSCULAR; SOFT TISSUE at 08:43

## 2025-08-07 RX ADMIN — METHYLPREDNISOLONE ACETATE 20 MG: 80 INJECTION, SUSPENSION INTRA-ARTICULAR; INTRALESIONAL; INTRAMUSCULAR; SOFT TISSUE at 08:42
